# Patient Record
Sex: MALE | Race: WHITE
[De-identification: names, ages, dates, MRNs, and addresses within clinical notes are randomized per-mention and may not be internally consistent; named-entity substitution may affect disease eponyms.]

---

## 2019-06-10 ENCOUNTER — HOSPITAL ENCOUNTER (EMERGENCY)
Dept: HOSPITAL 97 - ER | Age: 39
Discharge: HOME | End: 2019-06-10
Payer: SELF-PAY

## 2019-06-10 VITALS — BODY MASS INDEX: 3124.4 KG/M2

## 2019-06-10 DIAGNOSIS — Z79.82: ICD-10-CM

## 2019-06-10 DIAGNOSIS — Z79.4: ICD-10-CM

## 2019-06-10 DIAGNOSIS — I10: ICD-10-CM

## 2019-06-10 DIAGNOSIS — F17.210: ICD-10-CM

## 2019-06-10 DIAGNOSIS — E08.10: Primary | ICD-10-CM

## 2019-06-10 DIAGNOSIS — K21.9: ICD-10-CM

## 2019-06-10 PROCEDURE — 80076 HEPATIC FUNCTION PANEL: CPT

## 2019-06-10 PROCEDURE — 86308 HETEROPHILE ANTIBODY SCREEN: CPT

## 2019-06-10 PROCEDURE — 81003 URINALYSIS AUTO W/O SCOPE: CPT

## 2019-06-10 PROCEDURE — 96374 THER/PROPH/DIAG INJ IV PUSH: CPT

## 2019-06-10 PROCEDURE — 82962 GLUCOSE BLOOD TEST: CPT

## 2019-06-10 PROCEDURE — 82010 KETONE BODYS QUAN: CPT

## 2019-06-10 PROCEDURE — 84484 ASSAY OF TROPONIN QUANT: CPT

## 2019-06-10 PROCEDURE — 93005 ELECTROCARDIOGRAM TRACING: CPT

## 2019-06-10 PROCEDURE — 71045 X-RAY EXAM CHEST 1 VIEW: CPT

## 2019-06-10 PROCEDURE — 85025 COMPLETE CBC W/AUTO DIFF WBC: CPT

## 2019-06-10 PROCEDURE — 96361 HYDRATE IV INFUSION ADD-ON: CPT

## 2019-06-10 PROCEDURE — 36415 COLL VENOUS BLD VENIPUNCTURE: CPT

## 2019-06-10 PROCEDURE — 80048 BASIC METABOLIC PNL TOTAL CA: CPT

## 2019-06-10 PROCEDURE — 83735 ASSAY OF MAGNESIUM: CPT

## 2019-06-10 PROCEDURE — 99285 EMERGENCY DEPT VISIT HI MDM: CPT

## 2019-06-10 NOTE — XMS REPORT
[facility]

 Created on:2019



Patient:Giuseppe Sorto

Sex:Male

:1980

External Reference #:[id number]





Demographics







 Address  [address] [place] [address]

 

 Phone  [phone]

 

 Preferred Language  en

 

 Marital Status  Unknown

 

 Moravian Affiliation  Unknown

 

 Race  White

 

 Ethnic Group  Unknown









Author







 Organization  [employer]









Care Team Providers







 Name  Role  Phone

 

 Burgos, Na  Provider Role  Unavailable









Allergies, Adverse Reactions, Alerts







 Substance  Reaction  Event Type

 

 N.K.D.A.  Info Not Available  Non Drug Allergy







Problems







 Problem Type  Condition  Code  Onset Dates  Condition Status

 

 Problem  Type 2 diabetes mellitus with  E11.65    Active



   hyperglycemia      

 

 Problem  Fatigue  R53.83    Active

 

 Problem  Long term current use of insulin  Z79.4    Active

 

 Problem  Hyperglycemia  R73.9    Active

 

 Problem  Body mass index (BMI) 23.0-23.9,  Z68.23    Active



   adult      

 

 Problem  Seasonal allergies  J30.2    Active

 

 Problem  Gastrointestinal bleeding  K92.2    Active

 

 Problem  GERD (gastroesophageal reflux  K21.9    Active



   disease)      

 

 Problem  Back pain, lumbosacral  M54.5    Active

 

 Problem  Diabetes  E11.9    Active

 

 Assessment  Hyperglycemia  R73.9    Active

 

 Assessment  Seasonal allergies  J30.2    Active

 

 Assessment  Body mass index (BMI) 23.0-23.9,  Z68.23    Active



   adult      

 

 Assessment  GERD (gastroesophageal reflux  K21.9    Active



   disease)      

 

 Assessment  Long term current use of insulin  Z79.4    Active

 

 Assessment  Type 2 diabetes mellitus with  E11.65    Active



   hyperglycemia      

 

 Assessment  Acute effusion of left ear  H65.192    Active

 

 Assessment  Back pain, lumbosacral  M54.5    Active

 

 Assessment  Contact dermatitis due to  L23.1    Active



   adhesives, unspecified contact      



   dermatitis type      







Medications







 Medication  Code  Code  Instructions  Start  End  Status  Dosage



   System      Date  Date    

 

 Metformin HCl  NDC  60564050373  1000 MG Orally      Active  1 tablet with



       Twice a day        meals

 

 Lisinopril  NDC  [id number]  5 MG Orally      Active  1 tablet



       Once a day        

 

 Lantus  NDC  86536013296  100 UNIT/ML      Active  30 units



 SoloStar      Subcutaneous        



       once daily        

 

 FreeStyle  NDC  23378347839  -  Oct 24,    Active  as directed



 Tommy 14 Day        2018      



 Sensor              

 

 Clotrimazole-  NDC  45737430440  1-0.05 %  Dec 14,    Active  1 application



 Betamethasone      Externally  2018      to affected



       Twice a day        area

 

 Zyrtec  NDC  50119413383  10 MG Orally      Active  1 tablet



 Allergy      Once a day        

 

 Silvadene  NDC  55460433053  1 % Externally      Active  1 application



       Once a day        to affected



               area

 

 Jardiance  NDC  96558597380  25 MG Orally      Active  1 tablet



       Once a day        

 

 Omeprazole  NDC  62014546143  40 MG Orally      Active  1 capsule



       Once a day        

 

 FreeStyle  NDC  21702344986  -  Oct 24,    Active  as directed



 Tommy 14 Day        2018      



 [place]              

 

 Januvia  NDC  46832151753  100 MG Orally      Active  1 tablet



       Once a day        

 

 Aspirin  NDC  91691056930  81 MG Orally      Active  1 tablet



       Once a day        







Results

No Known Results



Summary Purpose

[employer]ClinicalWorks Submission

## 2019-06-10 NOTE — XMS REPORT
[facility]

 Created on:2019



Patient:Giuseppe Sorto

Sex:Male

:1980

External Reference #:[id number]





Demographics







 Address  [address] [place] [address]

 

 Phone  [phone]

 

 Preferred Language  en

 

 Marital Status  Unknown

 

 Zoroastrian Affiliation  Unknown

 

 Race  White

 

 Ethnic Group  Unknown









Author







 Organization  [employer]









Care Team Providers







 Name  Role  Phone

 

 Burgos, Na  Provider Role  Unavailable









Allergies

No Known Allergies



Problems







 Problem Type  Condition  Code  Onset Dates  Condition Status

 

 Problem  Type 2 diabetes mellitus with  E11.65    Active



   hyperglycemia      

 

 Problem  Fatigue  R53.83    Active

 

 Problem  Long term current use of insulin  Z79.4    Active

 

 Problem  Hyperglycemia  R73.9    Active

 

 Problem  Body mass index (BMI) 23.0-23.9,  Z68.23    Active



   adult      

 

 Problem  Seasonal allergies  J30.2    Active

 

 Problem  Gastrointestinal bleeding  K92.2    Active

 

 Problem  GERD (gastroesophageal reflux  K21.9    Active



   disease)      

 

 Problem  Back pain, lumbosacral  M54.5    Active

 

 Problem  Diabetes  E11.9    Active







Medications

No Known Medications



Results

No Known Results



Summary Purpose

[employer] Submission

## 2019-06-10 NOTE — XMS REPORT
[facility]

 Created on:2018



Patient:Giuseppe Sorto

Sex:Male

:1980

External Reference #:[id number]





Demographics







 Address  [address] [place] [address]

 

 Phone  [phone]

 

 Preferred Language  en

 

 Marital Status  Unknown

 

 Denominational Affiliation  Unknown

 

 Race  White

 

 Ethnic Group  Not  or 









Author







 Organization  [employer]









Care Team Providers







 Name  Role  Phone

 

 Burgos, Na  Provider Role  Unavailable









Allergies

No Known Allergies



Problems







 Problem Type  Condition  Code  Onset Dates  Condition Status

 

 Problem  Long term current use of insulin  Z79.4    Active

 

 Problem  Type 2 diabetes mellitus with  E11.65    Active



   hyperglycemia      

 

 Problem  Body mass index (BMI) 23.0-23.9,  Z68.23    Active



   adult      

 

 Problem  Back pain, lumbosacral  M54.5    Active

 

 Problem  Hyperglycemia  R73.9    Active

 

 Problem  GERD (gastroesophageal reflux  K21.9    Active



   disease)      

 

 Problem  Fatigue  R53.83    Active

 

 Problem  Diabetes  E11.9    Active

 

 Problem  Gastrointestinal bleeding  K92.2    Active







Medications

No Known Medications



Results

No Known Results



Summary Purpose

[employer] Submission

## 2019-06-10 NOTE — XMS REPORT
Patient Summary Document

 Created on:Julianne 10, 2019



Patient:HENOK SAMANO

Sex:Male

:1980

External Reference #:[id number]





Demographics







 Address  [address] [place] [address]

 

 Home Phone  [phone]

 

 Email Address  [email or web address]

 

 Preferred Language  Unknown

 

 Marital Status  Unknown

 

 Yazidism Affiliation  Unknown

 

 Race  Unknown

 

 Additional Race(s)  Unavailable

 

 Ethnic Group  Unknown









Author







 Organization  [facility]

 

 Address  [address] [place] [address] [address]. Hernández [address] [place] [address]

 

 Phone  [phone]









Care Team Providers







 Name  Role  Phone

 

 Unavailable  Unavailable  Unavailable









Problems

This patient has no known problems.



Allergies, Adverse Reactions, Alerts

This patient has no known allergies or adverse reactions.



Medications

This patient has no known medications.

## 2019-06-10 NOTE — XMS REPORT
[facility]

 Created on:2018



Patient:Giuseppe Sorto

Sex:Male

:1980

External Reference #:[id number]





Demographics







 Address  [address] [place] [address]

 

 Phone  [phone]

 

 Preferred Language  en

 

 Marital Status  Unknown

 

 Sabianism Affiliation  Unknown

 

 Race  White

 

 Ethnic Group  Not  or 









Author







 Organization  [employer]









Care Team Providers







 Name  Role  Phone

 

 Burgos, Na  Provider Role  Unavailable









Allergies

No Known Allergies



Problems







 Problem Type  Condition  Code  Onset Dates  Condition Status

 

 Problem  Long term current use of insulin  Z79.4    Active

 

 Problem  Type 2 diabetes mellitus with  E11.65    Active



   hyperglycemia      

 

 Assessment  Diabetes  E11.9    Active

 

 Problem  Body mass index (BMI) 23.0-23.9,  Z68.23    Active



   adult      

 

 Problem  Back pain, lumbosacral  M54.5    Active

 

 Problem  Hyperglycemia  R73.9    Active

 

 Problem  GERD (gastroesophageal reflux  K21.9    Active



   disease)      

 

 Problem  Fatigue  R53.83    Active

 

 Problem  Diabetes  E11.9    Active

 

 Problem  Gastrointestinal bleeding  K92.2    Active







Medications

No Known Medications



Results

No Known Results



Summary Purpose

[employer]ClinicalWorks Submission

## 2019-06-10 NOTE — XMS REPORT
[facility]

 Created on:2018



Patient:Giuseppe Sorto

Sex:Male

:1980

External Reference #:[id number]





Demographics







 Address  [address] [place] [address]

 

 Phone  [phone]

 

 Preferred Language  en

 

 Marital Status  Unknown

 

 Temple Affiliation  Unknown

 

 Race  White

 

 Ethnic Group  Not  or 









Author







 Organization  [employer]









Care Team Providers







 Name  Role  Phone

 

 Burgos, Mary  Provider Role  Unavailable









Allergies, Adverse Reactions, Alerts







 Substance  Reaction  Event Type

 

 N.K.D.A.  Info Not Available  Non Drug Allergy







Problems







 Problem Type  Condition  Code  Onset Dates  Condition Status

 

 Assessment  Back pain, lumbosacral  M54.5    Active

 

 Problem  Long term current use of insulin  Z79.4    Active

 

 Problem  Type 2 diabetes mellitus with  E11.65    Active



   hyperglycemia      

 

 Problem  Body mass index (BMI) 23.0-23.9,  Z68.23    Active



   adult      

 

 Problem  Back pain, lumbosacral  M54.5    Active

 

 Problem  Hyperglycemia  R73.9    Active

 

 Problem  GERD (gastroesophageal reflux  K21.9    Active



   disease)      

 

 Problem  Fatigue  R53.83    Active

 

 Problem  Diabetes  E11.9    Active

 

 Problem  Gastrointestinal bleeding  K92.2    Active

 

 Assessment  GERD (gastroesophageal reflux  K21.9    Active



   disease)      

 

 Assessment  Hyperglycemia  R73.9    Active

 

 Assessment  Long term current use of insulin  Z79.4    Active

 

 Assessment  Body mass index (BMI) 23.0-23.9,  Z68.23    Active



   adult      

 

 Assessment  Type 2 diabetes mellitus with  E11.65    Active



   hyperglycemia      







Medications







 Medication  Code  Code  Instructions  Start  End  Status  Dosage



   System      Date  Date    

 

 Januvia  NDC  [id number]  100 MG Orally      Active  1 tablet



       Once a day        

 

 Lisinopril  NDC  43687911416  5 MG Orally      Active  1 tablet



       Once a day        

 

 Aspirin  NDC  40359933543  81 MG Orally      Active  1 tablet



       Once a day        

 

 Jardiance  NDC  80392161650  25 MG Orally      Active  1 tablet



       Once a day        

 

 Omeprazole  NDC  74590139027  40 MG Orally      Active  1 capsule



       Once a day        

 

 Zyrtec  NDC  60775159659  10 MG Orally      Active  1 tablet



 Allergy      Once a day        

 

 Silvadene  NDC  20971964048  1 % Externally      Active  1 application



       Once a day        to affected



               area

 

 Metformin HCl  NDC  04953122092  1000 MG Orally      Active  1 tablet with



       Twice a day        meals

 

 Lantus  NDC  94799060406  100 UNIT/ML      Active  30 units



 SoloStar      Subcutaneous        



       once daily        

 

 Tizanidine  NDC  75216402886  2 MG Orally at  Julianne 15,  Julianne  Active  1 tablet 
as



 HCl      bedtime and may  2018  25,    needed



       increase to    2018    



       every 8 hours        



       as needed        







Results

No Known Results



Summary Purpose

eClinicalWorks Submission

## 2019-06-11 VITALS — TEMPERATURE: 98.7 F | DIASTOLIC BLOOD PRESSURE: 79 MMHG | SYSTOLIC BLOOD PRESSURE: 115 MMHG

## 2019-06-11 VITALS — OXYGEN SATURATION: 97 %

## 2019-06-11 LAB
ALBUMIN SERPL BCP-MCNC: 3.8 G/DL (ref 3.4–5)
ALP SERPL-CCNC: 72 U/L (ref 45–117)
ALT SERPL W P-5'-P-CCNC: 24 U/L (ref 12–78)
AST SERPL W P-5'-P-CCNC: 16 U/L (ref 15–37)
BUN BLD-MCNC: 19 MG/DL (ref 7–18)
BUN BLD-MCNC: 21 MG/DL (ref 7–18)
COHGB MFR BLDA: 0.8 % (ref 0–1.5)
GLUCOSE SERPLBLD-MCNC: 118 MG/DL (ref 74–106)
GLUCOSE SERPLBLD-MCNC: 428 MG/DL (ref 74–106)
HCT VFR BLD CALC: 43 % (ref 39.6–49)
HDLC SERPL-MCNC: 25 MG/DL (ref 40–60)
LDLC SERPL CALC-MCNC: (no result) MG/DL (ref ?–130)
LYMPHOCYTES # SPEC AUTO: 1.6 K/UL (ref 0.7–4.9)
MAGNESIUM SERPL-MCNC: 1.8 MG/DL (ref 1.8–2.4)
OXYHGB MFR BLDA: 94 % (ref 94–97)
PMV BLD: 10.3 FL (ref 7.6–11.3)
POTASSIUM SERPL-SCNC: 3.2 MMOL/L (ref 3.5–5.1)
POTASSIUM SERPL-SCNC: 4.3 MMOL/L (ref 3.5–5.1)
RBC # BLD: 4.27 M/UL (ref 4.33–5.43)
SAO2 % BLDA: 96.4 % (ref 92–98.5)
TROPONIN (EMERG DEPT USE ONLY): < 0.02 NG/ML (ref 0–0.04)

## 2019-06-11 NOTE — P.HP
Certification for Inpatient


Patient admitted to: Inpatient


With expected LOS: >2 Midnights


Practitioner: I am a practitioner with admitting privileges, knowledge of 

patient current condition, hospital course, and medical plan of care.


Services: Services provided to patient in accordance with Admission 

requirements found in Title 42 Section 412.3 of the Code of Federal Regulations





Patient History


Date of Service: 06/11/19


Reason for admission: DKA


History of Present Illness: 





Mr Sorto is a 39 years old male with history of DM II, insulin dependent, 

who start to feel progressively weak about 5 days ago. Yesterday, he start with 

abdominal pain, in epigastric area, associated with nausea, no vomiting. BS was 

elevated, 420. He denied fever or chills. He has never had this symptoms 

before. At my encounter he was alert and oriented. Lab work remarkable for 

hyperglycemia, low bicarbonate with anion gap of 15. No obvious signs of acute 

infection. 


Allergies





No Known Allergies Allergy (Verified 02/25/15 08:29)


 





Home Medications: 








Aspirin [Children's Aspirin] 1 tab PO DAILY 02/25/15 


Esomeprazole Mag Trihydrate [Nexium] 40 mg PO DAILY 02/25/15 


Insulin Glargine Human [Lantus*] 36 unit SQ DAILY 02/25/15 


Lisinopril [Prinivil*] 5 mg PO DAILY 02/25/15 


Metformin HCl [Glucophage] 1,000 mg PO BID 02/25/15 


Sitagliptin Phosphate [Januvia*] 100 mg PO DAILY 02/25/15 








- Past Medical/Surgical History


Has patient received pneumonia vaccine in the past: Yes


Diabetic: Yes


-: DM II


-: esophagitis


-: vasectomy





- Social History


Smoking Status: Light Tobacco smoker (1-9 cigarettes/day)


Counseled patient to stop smoking for: less than 10 minutes


Alcohol use: Yes


CD- Drugs: No


Caffeine use: Yes


Place of Residence: Home





Review of Systems


10-point ROS is otherwise unremarkable





Physical Examination





- Physical Exam


General: Alert, In no apparent distress


HEENT: Atraumatic, PERRLA, Mucous membr. moist/pink, EOMI, Sclerae nonicteric


Neck: Supple, 2+ carotid pulse no bruit, No LAD, Without JVD or thyroid 

abnormality


Respiratory: Clear to auscultation bilaterally, Normal air movement


Cardiovascular: Regular rate/rhythm, Normal S1 S2


Gastrointestinal: Normal bowel sounds, Tenderness (tender to palpation 

epigastric area.)


Musculoskeletal: No tenderness


Integumentary: No rashes


Neurological: Normal gait, Normal speech, Normal strength at 5/5 x4 extr, 

Normal tone, Normal affect


Lymphatics: No axilla or inguinal lymphadenopathy





- Studies


Laboratory Data (last 24 hrs)





06/10/19 23:34: WBC 5.2, Hgb 15.2, Hct 43.0, Plt Count 197


06/10/19 23:34: Sodium 132 L, Potassium 4.3, BUN 21 H, Creatinine 1.38 H, 

Glucose 428 H*, Magnesium 1.8, Total Bilirubin 0.6, AST 16, ALT 24, Alkaline 

Phosphatase 72








Assessment and Plan





- Problems (Diagnosis)


(1) Diabetes mellitus


Current Visit: Yes   Status: Acute   


Qualifiers: 


   Diabetes mellitus type: type 2   Diabetes mellitus long term insulin use: 

with long term use   Diabetes mellitus complication status: with other 

specified complication   Qualified Code(s): E11.69 - Type 2 diabetes mellitus 

with other specified complication; Z79.4 - Long term (current) use of insulin   





(2) DKA (diabetic ketoacidoses)


Current Visit: Yes   Status: Acute   


Qualifiers: 


   Diabetes mellitus type: type 2   Diabetes mellitus complication detail: 

without coma   Qualified Code(s): E11.10 - Type 2 diabetes mellitus with 

ketoacidosis without coma   





- Plan





The patient will be admitted to the hospital due to mild DKA. Will continue 

with insulin drip and fluid replacement by protocol.





- Advance Directives


Does patient have a Living Will: No


Does patient have a Durable POA for Healthcare: No





- Code Status/Comfort Care


Code Status Assessed: Yes


Code Status: Full Code

## 2019-06-11 NOTE — RAD REPORT
EXAM DESCRIPTION:  James Single View6/10/2019 11:44 pm

 

CLINICAL HISTORY:  Shortness of breath

 

COMPARISON:  none

 

FINDINGS:   The lungs appear clear of acute infiltrate. The heart is normal size

 

IMPRESSION:   No acute abnormalities displayed

## 2019-06-11 NOTE — P.SSS
Patient History


Date of Service: 06/11/19


Primary Care Provider: Dr. Burgos; Endocrine-Dr. Burgess


Reason for admission: Nausea and vomiting


History of Present Illness: 





39-year-old  male presented emergency room with increased nausea and 

vomiting.  Patient has diabetes mellitus type 2.  He is insulin dependent.  

Patient reports not taking his basal insulin at times.  Patient found to have 

DKA but mild.





Patient was seen and evaluated the emergency room.  He was treated for DKA.  

This resolved very quickly.  He was transition to his normal regimen.





Patient without any significant nausea and vomiting.  Patient discharged home 

to continue with his medication.  Strict compliance with his basal insulin was 

addressed in detail.  Patient understands this.  Patient plans to follow up 

with Endocrinology


Allergies





No Known Allergies Allergy (Verified 02/25/15 08:29)


 





Home medications list reviewed: Yes


Home Medications: 








Aspirin [Children's Aspirin] 1 tab PO DAILY 02/25/15 


Esomeprazole Mag Trihydrate [Nexium] 40 mg PO DAILY 02/25/15 


Lisinopril [Prinivil*] 5 mg PO DAILY 02/25/15 


Metformin HCl [Glucophage] 1,000 mg PO BID 02/25/15 


Sitagliptin Phosphate [Januvia*] 100 mg PO DAILY 02/25/15 


Insulin Glargine Human [Lantus*] 36 unit SQ DAILY #1 chris 06/11/19 








- Past Medical/Surgical History


Has patient received pneumonia vaccine in the past: Yes


Diabetic: Yes


-: DM II


-: Hypertension


-: GERD


-: vasectomy


Psychosocial/ Personal History: Patient lives at home.





- Family History


Family History: Reviewed- Non-Contributory





- Social History


Smoking Status: Light Tobacco smoker (1-9 cigarettes/day)


Alcohol use: Yes


CD- Drugs: No


Caffeine use: Yes


Place of Residence: Home





Review of Systems


General: As per HPI


Eyes: Unremarkable


ENT: Unremarkable


Respiratory: Unremarkable


Cardiovascular: Unremarkable


Gastrointestinal: Nausea, Vomiting, As per HPI


Genitourinary: Unremarkable


Musculoskeletal: Unremarkable


Integumentary: Unremarkable


Neurological: Unremarkable


Lymphatics: Unremarkable





Physical Examination





- Vital Signs


Temperature: 98.7 F


Blood Pressure: 115/79


Pulse: 85


Respirations: 16


Pulse Ox (%): 99





- Physical Exam


General: Alert, In no apparent distress, Oriented x3, Cooperative


HEENT: Atraumatic, Mucous membr. moist/pink


Neck: Supple


Respiratory: Clear to auscultation bilaterally, Normal air movement


Cardiovascular: Normal pulses, Regular rate/rhythm


Gastrointestinal: Normal bowel sounds, Soft and benign, Non-distended, No 

tenderness, No masses, No rebound, No guarding


Musculoskeletal: No erythema, No tenderness, No warmth


Integumentary: No tenderness/swelling, No erythema, No warmth, No cyanosis


Neurological: Normal speech, Normal strength at 5/5 x4 extr, Normal tone, 

Normal affect





- Studies


Laboratory Data (last 24 hrs)





06/11/19 12:30: Sodium Cancelled, Potassium Cancelled, BUN Cancelled, 

Creatinine Cancelled, Glucose Cancelled


06/11/19 08:30: Sodium Cancelled, Potassium Cancelled, BUN Cancelled, 

Creatinine Cancelled, Glucose Cancelled


06/11/19 04:30: Triglycerides 800 H, Cholesterol 227 H, LDL Cholesterol Direct 

108, HDL Cholesterol 25 L, Cholesterol/HDL Ratio 9.08


06/11/19 04:30: Sodium 140, Potassium 3.2 L, BUN 19 H, Creatinine 0.89, Glucose 

118 H


06/10/19 23:34: WBC 5.2, Hgb 15.2, Hct 43.0, Plt Count 197


06/10/19 23:34: Sodium 132 L, Potassium 4.3, BUN 21 H, Creatinine 1.38 H, 

Glucose 428 H*, Magnesium 1.8, Total Bilirubin 0.6, AST 16, ALT 24, Alkaline 

Phosphatase 72





Treatment Summary: 





Impression:


DKA with diabetes mellitus type 2, insulin-dependent


Hypertension


GERD





Plan:


Patient presented with DKA.  Patient with history of diabetes type 2 insulin 

dependent.  DKA has resolved.  Patient without any nausea, vomiting at this 

time.  Lab significantly improved.  Hemoglobin A1c 11.0.  Patient has not been 

compliant with his insulin regimen-Lantus.  Patient understands now that he 

needs to take his insulin-Lantus 36 units SC daily.  He had not been taking 

this on a regular basis.  At discharge patient will continue with Lantus 36 

units subcu daily.  Recommendation is to maintain blood sugars less 140 fasting 

and less than 200 after meals.  If blood sugar remains above 200 he can 

increase Lantus by 1-2 units daily for better blood sugar control.  Further 

adjustment in medication may be required.  His PCP or endocrinologist can 

further adjust his medication.  Patient also takes metformin 1000 mg 1 pill 

twice daily and Januvia 100 mg daily.  Patient will continue with his diabetic 

regimen.  Recommendation is for the patient to follow up with Endocrinology in 1

-2 weeks to follow up this hospitalization and continue his care.  Education on 

DKA and diabetes mellitus will be provided.





Patient with hypertension.  Patient will resume lisinopril 5 mg daily.  

Recommendation is to maintain blood pressure less than 150/80.  Further 

adjustment can be done by his PCP for better control.





Patient with history of GERD.  At discharge he will continue with Nexium 40 mg 

daily.





- Disposition


Disposition: ROUTINE DISCHARGE


Condition: GOOD


Prescriptions: 


Insulin Glargine Human [Lantus*] 36 unit SQ DAILY #1 chris


Followup: 


Mary Burgos DO [Primary Care Provider] - Prvqi ORTIZ As Needed


Patient Discharge Instructions: 1.  Patient will follow up with his PCP on 

Friday to follow up this hospitalization.  2.  Patient presented with DKA.  

Patient with history of diabetes type 2 insulin dependent.  DKA has resolved.  

Patient without any nausea, vomiting.  Lab stable at this time.  Hemoglobin A1c 

11.0.  Patient has not been compliant with his insulin regimen.  Patient 

understands that the patient needs to take his insulin-Lantus 36 units SC 

daily.  At discharge patient will continue with Lantus 36 units subcu daily.  

Recommendation is to maintain blood sugars less 140 fasting and less than 200 

after meals.  If blood sugar remains above 200 he can increase Lantus by 1-2 

units daily for better blood sugar control.  Further adjustment in medication 

may be required.  His PCP or endocrinologist can further adjust his medication.

  Patient also takes metformin 1000 mg 1 pill twice daily and Januvia 100 mg 

daily.  Patient will continue with his diabetic regimen.  Recommendation is for 

the patient to follow up with Endocrinology in 1-2 weeks to follow up this 

hospitalization and continue his care.  Education on DKA and diabetes mellitus 

will be provided.  3.  Patient with hypertension.  Patient will resume 

lisinopril 5 mg daily.  Recommendation is to maintain blood pressure less than 

150/80.  Further adjustment can be done by his PCP for better control.  4.  

Patient with history of GERD.  At discharge he will continue with Nexium 40 mg 

daily.


Diet: ADA (2000 ADA)


Activity: Ad ismael


Time Spent Managing Pts Care (In Minutes): 55

## 2019-06-11 NOTE — EDPHYS
Physician Documentation                                                                           

 [facility]                                                                 

Name: Giuseppe Sorto                                                                          

Age: 39 yrs                                                                                       

Sex: Male                                                                                         

: 1980                                                                                   

MRN: [id number]                                                                                   

Arrival Date: 06/10/2019                                                                          

Time: 22:40                                                                                       

Account#: [id number]                                                                            

Bed 17                                                                                            

Private MD:                                                                                       

ED Physician Fred Willard                                                                    

HPI:                                                                                              

                                                                                             

00:36 This 39 yrs old  Male presents to ER via Ambulatory with complaints of High    kb  

      Blood Sugar, Abdominal Pain.                                                                

00:36 The patient or guardian reports hyperglycemia, that was potentially precipitated by no  kb  

      particular event, with the patient's symptoms witnessed by no one. Onset: The               

      symptoms/episode began/occurred today. Associated signs and symptoms: Pertinent             

      positives: abdominal pain, weakness, fatigue. Current symptoms: In the emergency            

      department the patient's symptoms are unchanged from the initial presentation. The          

      patient has not experienced similar symptoms in the past. The patient has not recently      

      seen a physician.                                                                           

                                                                                                  

Historical:                                                                                       

- Allergies:                                                                                      

06/10                                                                                             

22:45 No Known Allergies;                                                                     ed1 

- Home Meds:                                                                                      

22:45 omeprazole 40 mg Oral cpDR 1 cap once daily [Active]; Jardiance 10 mg oral tab 1 tab    ed1 

      once daily [Active]; Januvia 100 mg oral tab 1 tab once daily [Active]; metformin 1,000     

      mg Oral TG24 1 tab 2 times per day [Active]; lisinopril 5 mg Oral tab 1 tab once daily      

      [Active]; Lantus 100 unit/mL Sub-Q soln 30 unit daily [Active]; aspirin 81 mg Oral chew     

      1 tab once daily [Active]; nateglinide 120 mg oral tab 1 tab 3 times per day [Active];      

- PMHx:                                                                                           

22:45 Diabetes - NIDDM; Esophigitis;                                                          ed1 

- PSHx:                                                                                           

22:45 None;                                                                                   ed1 

                                                                                                  

- Immunization history:: Adult Immunizations up to date.                                          

- Social history:: Smoking status: Patient uses tobacco products, denies chronic                  

  smoking, but will smoke occasionally.                                                           

- Ebola Screening: : Patient negative for fever greater than or equal to 101.5 degrees            

  Fahrenheit, and additional compatible Ebola Virus Disease symptoms Patient denies               

  exposure to infectious person Patient denies travel to an Ebola-affected area in the            

  21 days before illness onset No symptoms or risks identified at this time.                      

                                                                                                  

                                                                                                  

ROS:                                                                                              

                                                                                             

00:35 Cardiovascular: Negative for chest pain, palpitations, and edema, Respiratory: Negative kb  

      for shortness of breath, cough, wheezing, and pleuritic chest pain, Back: Negative for      

      injury and pain, : Negative for injury, bleeding, discharge, and swelling,                

      MS/Extremity: Negative for injury and deformity, Skin: Negative for injury, rash, and       

      discoloration.                                                                              

      Constitutional: Positive for malaise.                                                       

      Abdomen/GI: Positive for abdominal pain, Negative for nausea, vomiting, and diarrhea.       

      Neuro: Positive for weakness.                                                               

                                                                                                  

Exam:                                                                                             

00:35 Constitutional:  This is a well developed, well nourished patient who is awake, alert,  kb  

      and in no acute distress. Head/Face:  Normocephalic, atraumatic. Chest/axilla:  Normal      

      chest wall appearance and motion.  Nontender with no deformity.  No lesions are             

      appreciated. Cardiovascular:  Regular rate and rhythm with a normal S1 and S2.  No          

      gallops, murmurs, or rubs.  Normal PMI, no JVD.  No pulse deficits. Respiratory:  Lungs     

      have equal breath sounds bilaterally, clear to auscultation and percussion.  No rales,      

      rhonchi or wheezes noted.  No increased work of breathing, no retractions or nasal          

      flaring. Back:  No spinal tenderness.  No costovertebral tenderness.  Full range of         

      motion. Skin:  Warm, dry with normal turgor.  Normal color with no rashes, no lesions,      

      and no evidence of cellulitis. MS/ Extremity:  Pulses equal, no cyanosis.                   

      Neurovascular intact.  Full, normal range of motion. Neuro:  Awake and alert, GCS 15,       

      oriented to person, place, time, and situation.  Cranial nerves II-XII grossly intact.      

      Motor strength 5/5 in all extremities.  Sensory grossly intact.  Cerebellar exam            

      normal.  Normal gait.                                                                       

00:35 Abdomen/GI: Inspection: abdomen appears normal, Bowel sounds: normal, in all quadrants,     

      Palpation: soft, in all quadrants, mild abdominal tenderness, in all quadrants.             

                                                                                                  

Vital Signs:                                                                                      

06/10                                                                                             

22:45  / 84; Pulse 114; Resp 19; Temp 97.8; Pulse Ox 95% on R/A; Weight 72.57 kg;       ed1 

      Height 6 ft. 0 in. (182.88 cm); Pain 3/10;                                                  

23:45  / 82; Pulse 78; Resp 17; Pulse Ox 97% on R/A;                                    ca1 

                                                                                             

00:22  / 89; Pulse 80; Resp 17 S; Temp 98(O); Pulse Ox 97% on R/A;                      ca1 

01:02  / 86; Pulse 73; Resp 18; Pulse Ox 98% on R/A; Pain 0/10;                         mg2 

02:10  / 84; Pulse 73; Resp 18; Temp 98; Pulse Ox 97% on R/A; Pain 0/10;                mg2 

06/10                                                                                             

22:45 Body Mass Index 21.70 (72.57 kg, 182.88 cm)                                             ed1 

                                                                                                  

MDM:                                                                                              

06/10                                                                                             

23:06 Patient medically screened.                                                             kb  

                                                                                             

00:33 Data reviewed: vital signs, nurses notes. Data interpreted: Pulse oximetry: on room air kb  

      is 97 %. Interpretation: normal. Counseling: I had a detailed discussion with the           

      patient and/or guardian regarding: the historical points, exam findings, and any            

      diagnostic results supporting the discharge/admit diagnosis, lab results, radiology         

      results, the need for further work-up and treatment in the hospital. Physician              

      consultation: Madelyn Negron MD was contacted at 00:34, regarding admission, to the         

      ICU, patient's condition, and will see patient in ED, shortly.                              

                                                                                                  

06/10                                                                                             

23:12 Order name: Acetone, Serum                                                              kb  

06/10                                                                                             

23:12 Order name: Basic Metabolic Panel                                                       kb  

06/10                                                                                             

23:12 Order name: CBC with Diff; Complete Time: 00:23                                         kb  

06/10                                                                                             

23:12 Order name: LFT's; Complete Time: 00:29                                                 kb  

06/10                                                                                             

23:12 Order name: Magnesium; Complete Time: 00:29                                             kb  

06/10                                                                                             

23:12 Order name: Troponin (emerg Dept Use Only); Complete Time: 00:29                        kb  

06/10                                                                                             

23:12 Order name: Mono Screen Profile; Complete Time: 00:09                                   kb  

06/10                                                                                             

23:14 Order name: Acetone Level; Complete Time: 00:29                                         EDMS

06/10                                                                                             

23:14 Order name: Basic Metabolic Panel; Complete Time: 00:29                                 EDMS

06/10                                                                                             

23:42 Order name: Urine Dipstick--Ancillary (enter results); Complete Time: 00:23             mw2 

                                                                                             

00:32 Order name: ABG; Complete Time: 01:10                                                   kb  

                                                                                             

05:08 Order name: Lipid Profile                                                               EDMS

                                                                                             

05:08 Order name: Basic Metabolic Panel                                                       EDMS

                                                                                             

05:12 Order name: Hemoglobin A1c                                                              EDMS

06/10                                                                                             

23:12 Order name: IV Start; Complete Time: 23:34                                              kb  

06/10                                                                                             

23:12 Order name: XRAY Chest (1 view)                                                         kb  

06/10                                                                                             

23:12 Order name: EKG; Complete Time: 23:15                                                   kb  

06/10                                                                                             

23:12 Order name: Cardiac monitoring; Complete Time: 23:35                                    kb  

06/10                                                                                             

23:12 Order name: EKG - Nurse/Tech; Complete Time: 23:34                                      kb  

06/10                                                                                             

23:12 Order name: Labs collected and sent; Complete Time: 23:34                               kb  

06/10                                                                                             

23:12 Order name: O2 Per Protocol; Complete Time: 23:34                                       kb  

06/10                                                                                             

23:12 Order name: O2 Sat Monitoring; Complete Time: 23:34                                     kb  

                                                                                             

05:22 Order name: LDL, Direct                                                                 EDMS

                                                                                                  

Administered Medications:                                                                         

06/10                                                                                             

23:35 Drug: NS 0.9% 1000 ml Route: IV; Rate: 1000 ml; Site: right antecubital;                ca1 

                                                                                             

00:36 Follow up: Response: No adverse reaction; IV Status: Completed infusion                 ca1 

00:52 Drug: NS 0.9% 1000 ml Route: IV; Rate: 1000 ml; Site: right antecubital;                mg2 

03:21 Follow up: Response: No adverse reaction; IV Status: Completed infusion; IV Intake:     mg2 

      1000ml                                                                                      

00:53 Drug: Insulin Drip - (Insulin Regular Human 100 units, NS 0.9% 100 ml) {Co-Signature:   mg2 

      ca1 (Teri Aly RN).} Route: IV; Rate: calculated rate; Site: right antecubital;           

                                                                                                  

                                                                                                  

Point of Care Testing:                                                                            

      Blood Glucose:                                                                              

06/10                                                                                             

22:45 Blood Glucose: 438 mg/dL;                                                               ed1 

                                                                                             

02:10 Blood Glucose: 161 mg/dL;                                                               mg2 

      Ranges:                                                                                     

      Critical Glucose Levels:Adult <50 mg/dl or >400 mg/dl  <40 mg/dl or >180 mg/dl       

Disposition:                                                                                      

19 00:34 Hospitalization ordered by Madelyn Negron for Inpatient Admission. Preliminary    

  diagnosis is Diabetes mellitus due to underlying condition with ketoacidosis                    

  without coma.                                                                                   

- Bed requested for [facility] ER HOLD.                                                                 

- Status is Inpatient Admission.                                                              bp  

- Condition is Stable.                                                                            

- Problem is new.                                                                                 

- Symptoms are unchanged.                                                                         

UTI on Admission? No                                                                              

                                                                                                  

                                                                                                  

                                                                                                  

Signatures:                                                                                       

Dispatcher [employer]                           EDMS                                                 

Petrona Andrews, FNP-C                 FNP-Ckb                                                   

Evelyn Preciado RN                        RN   mw                                                   

Delma Cadet, RN                        RN   ed1                                                  

Dustin Caldwell, RN                      RN   bp                                                   

Arnie Bob, RN                    RN   mg2                                                  

Teri Aly, RN                        RN   ca1                                                  

Teri Aly RN                               ca1                                                  

                                                                                                  

Corrections: (The following items were deleted from the chart)                                    

01:06 00:34 Hospitalization Ordered by Madelyn Negron MD for Inpatient Admission. Preliminary mw  

      diagnosis is Diabetes mellitus due to underlying condition with ketoacidosis without        

      coma. Bed requested for Intensive Care Unit. Status is Inpatient Admission. Condition       

      is Stable. Problem is new. Symptoms are unchanged. UTI on Admission? No. kb                 

08:05 01:06 2019 00:34 Hospitalization Ordered by Madelyn Negron MD for Inpatient       bp  

      Admission. Preliminary diagnosis is Diabetes mellitus due to underlying condition with      

      ketoacidosis without coma. Bed requested for [facility] ER HOLD. Status is Inpatient              

      Admission. Condition is Stable. Problem is new. Symptoms are unchanged. UTI on              

      Admission? No. mw                                                                           

                                                                                                  

**************************************************************************************************

## 2019-06-11 NOTE — EKG
Test Date:    2019-06-10               Test Time:    23:25:12

Technician:   CA                                     

                                                     

MEASUREMENT RESULTS:                                       

Intervals:                                           

Rate:         79                                     

TN:           134                                    

QRSD:         82                                     

QT:           350                                    

QTc:          401                                    

Axis:                                                

P:            54                                     

TN:           134                                    

QRS:          69                                     

T:            46                                     

                                                     

INTERPRETIVE STATEMENTS:                                       

                                                     

Normal sinus rhythm

Normal ECG

No previous ECG available for comparison



Electronically Signed On 06-11-19 12:02:30 CDT by Leif Sutton

## 2019-06-11 NOTE — ER
Nurse's Notes                                                                                     

 [facility]                                                                 

Name: Giuseppe Sorto                                                                          

Age: 39 yrs                                                                                       

Sex: Male                                                                                         

: 1980                                                                                   

MRN: [id number]                                                                                   

Arrival Date: 06/10/2019                                                                          

Time: 22:40                                                                                       

Account#: [id number]                                                                            

Bed 17                                                                                            

Private MD:                                                                                       

Diagnosis: Diabetes mellitus due to underlying condition with ketoacidosis without coma           

                                                                                                  

Presentation:                                                                                     

06/10                                                                                             

22:40 Presenting complaint: Patient states: I have been very tired for over a week now. I     ed1 

      checked my blood sugar at home and it was 430. I have been having abdominal pain. I         

      feel so weak. Transition of care: patient was not received from another setting of          

      care. Onset of symptoms was Julianne 3, 2019. Risk Assessment: Do you want to hurt yourself     

      or someone else? Patient reports no desire to harm self or others. Initial Sepsis           

      Screen: Does the patient meet any 2 criteria? No. Patient's initial sepsis screen is        

      negative. Does the patient have a suspected source of infection? No. Patient's initial      

      sepsis screen is negative. Care prior to arrival: None.                                     

22:40 Method Of Arrival: Ambulatory                                                           ed1 

22:40 Acuity: MATY 3                                                                           ed1 

                                                                                                  

Triage Assessment:                                                                                

22:45 General: Appears in no apparent distress. Behavior is calm, cooperative. Pain:          ed1 

      Complains of pain in abdomen Pain currently is 3 out of 10 on a pain scale. at worst        

      was 9 out of 10 on a pain scale. GI: Reports upper abdominal pain, Patient currently        

      denies diarrhea, nausea, vomiting.                                                          

                                                                                                  

Historical:                                                                                       

- Allergies:                                                                                      

22:45 No Known Allergies;                                                                     ed1 

- Home Meds:                                                                                      

22:45 omeprazole 40 mg Oral cpDR 1 cap once daily [Active]; Jardiance 10 mg oral tab 1 tab    ed1 

      once daily [Active]; Januvia 100 mg oral tab 1 tab once daily [Active]; metformin 1,000     

      mg Oral TG24 1 tab 2 times per day [Active]; lisinopril 5 mg Oral tab 1 tab once daily      

      [Active]; Lantus 100 unit/mL Sub-Q soln 30 unit daily [Active]; aspirin 81 mg Oral chew     

      1 tab once daily [Active]; nateglinide 120 mg oral tab 1 tab 3 times per day [Active];      

- PMHx:                                                                                           

22:45 Diabetes - NIDDM; Esophigitis;                                                          ed1 

- PSHx:                                                                                           

22:45 None;                                                                                   ed1 

                                                                                                  

- Immunization history:: Adult Immunizations up to date.                                          

- Social history:: Smoking status: Patient uses tobacco products, denies chronic                  

  smoking, but will smoke occasionally.                                                           

- Ebola Screening: : Patient negative for fever greater than or equal to 101.5 degrees            

  Fahrenheit, and additional compatible Ebola Virus Disease symptoms Patient denies               

  exposure to infectious person Patient denies travel to an Ebola-affected area in the            

  21 days before illness onset No symptoms or risks identified at this time.                      

                                                                                                  

                                                                                                  

Screenin:35 Abuse screen: Denies threats or abuse. Denies injuries from another. Nutritional        ca1 

      screening: No deficits noted. Tuberculosis screening: No symptoms or risk factors           

      identified. Fall Risk None identified.                                                      

                                                                                                  

Assessment:                                                                                       

23:35 General: Appears in no apparent distress. comfortable, Behavior is calm, cooperative,   ca1 

      appropriate for age. General: Reports fatigue for 12-24 hours. Pain: Complains of pain      

      in abdomen Pain does not radiate. Pain currently is 7 out of 10 on a pain scale.            

      Quality of pain is described as squeezing, Pain began 1 day ago. Is intermittent.           

      Neuro: Level of Consciousness is awake, alert, obeys commands, Oriented to person,          

      place, time, situation. Cardiovascular: Heart tones S1 S2 present Capillary refill < 3      

      seconds Patient's skin is warm and dry. Cardiovascular: Rhythm is sinus rhythm.             

      Respiratory: Airway is patent Respiratory effort is even, unlabored, Respiratory            

      pattern is regular, symmetrical, Breath sounds are clear bilaterally. GI: Abdomen is        

      flat, non-distended, Bowel sounds present X 4 quads. Abd is soft and non tender X 4         

      quads. Patient currently denies diarrhea, nausea, vomiting. : No deficits noted. No       

      signs and/or symptoms were reported regarding the genitourinary system. EENT: No            

      deficits noted. No signs and/or symptoms were reported regarding the EENT system. Derm:     

      Skin is intact, is healthy with good turgor, Skin is pink, warm \T\ dry. Musculoskeletal:   

      Circulation, motion, and sensation intact. Capillary refill < 3 seconds, Range of           

      motion: intact in all extremities.                                                          

                                                                                             

00:22 Reassessment: Patient appears in no apparent distress at this time. Patient and/or      ca1 

      family updated on plan of care and expected duration. Pain level reassessed. Patient is     

      alert, oriented x 3, equal unlabored respirations, skin warm/dry/pink.                      

01:02 Reassessment: patient informed about the need for hospitalization to ICU. patient       mg2 

      agreed.                                                                                     

02:11 Reassessment: documentation will continue in [facility].                                  mg2 

07:00 Reassessment: PT IS ER HOLD. SEE [facility] FOR FURTHER DATA.                             bp  

07:55 Reassessment: PT D/C BY JUNG.                                                         bp  

                                                                                                  

Vital Signs:                                                                                      

06/10                                                                                             

22:45  / 84; Pulse 114; Resp 19; Temp 97.8; Pulse Ox 95% on R/A; Weight 72.57 kg;       ed1 

      Height 6 ft. 0 in. (182.88 cm); Pain 3/10;                                                  

23:45  / 82; Pulse 78; Resp 17; Pulse Ox 97% on R/A;                                    ca1 

                                                                                             

00:22  / 89; Pulse 80; Resp 17 S; Temp 98(O); Pulse Ox 97% on R/A;                      ca1 

01:02  / 86; Pulse 73; Resp 18; Pulse Ox 98% on R/A; Pain 0/10;                         mg2 

02:10  / 84; Pulse 73; Resp 18; Temp 98; Pulse Ox 97% on R/A; Pain 0/10;                mg2 

0610                                                                                             

22:45 Body Mass Index 21.70 (72.57 kg, 182.88 cm)                                             ed1 

                                                                                                  

ED Course:                                                                                        

06/10                                                                                             

22:40 Patient arrived in ED.                                                                  ed1 

22:41 Triage completed.                                                                       ed1 

22:45 Arm band placed on left wrist. Patient placed in waiting room, Patient notified of wait ed1 

      time.                                                                                       

23:06 Petrona Andrews FNP-C is PHCP.                                                        kb  

23:06 Fred Willard MD is Attending Physician.                                           kb  

23:08 Teri Aly, RN is Primary Nurse.                                                      ca1 

23:32 Inserted saline lock: 18 gauge in right antecubital area, using aseptic technique.      ca1 

      ,using aseptic technique. by Alyx Valentin,  Tech Blood collected.                            

23:35 Patient has correct armband on for positive identification. Placed in gown. Bed in low  ca1 

      position. Call light in reach. Side rails up X 1. Cardiac monitor on. Pulse ox on. NIBP     

      on. Warm blanket given.                                                                     

23:41 No provider procedures requiring assistance completed.                                  ca1 

23:43 XRAY Chest (1 view) In Process Unspecified.                                             EDMS

                                                                                             

00:02 X-ray completed. Portable x-ray completed in exam room. Patient tolerated procedure     kw  

      well.                                                                                       

00:34 Madelyn Negron MD is Hospitalizing Provider.                                          kb  

00:52 Inserted saline lock: 20 gauge in left antecubital area, using aseptic technique.       ca1 

08:05 IV discontinued, intact, bleeding controlled, No redness/swelling at site. Pressure     bp  

      dressing applied.                                                                           

                                                                                                  

Administered Medications:                                                                         

06/10                                                                                             

23:35 Drug: NS 0.9% 1000 ml Route: IV; Rate: 1000 ml; Site: right antecubital;                ca1 

06                                                                                             

00:36 Follow up: Response: No adverse reaction; IV Status: Completed infusion                 ca1 

00:52 Drug: NS 0.9% 1000 ml Route: IV; Rate: 1000 ml; Site: right antecubital;                mg2 

03:21 Follow up: Response: No adverse reaction; IV Status: Completed infusion; IV Intake:     mg2 

      1000ml                                                                                      

00:53 Drug: Insulin Drip - (Insulin Regular Human 100 units, NS 0.9% 100 ml) {Co-Signature:   mg2 

      ca1 (Teri Aly RN).} Route: IV; Rate: calculated rate; Site: right antecubital;           

                                                                                                  

                                                                                                  

Point of Care Testing:                                                                            

      Blood Glucose:                                                                              

06/10                                                                                             

22:45 Blood Glucose: 438 mg/dL;                                                               ed1 

                                                                                             

02:10 Blood Glucose: 161 mg/dL;                                                               mg2 

      Ranges:                                                                                     

                                                                                                  

Intake:                                                                                           

03:21 IV: 1000ml; Total: 1000ml.                                                              mg2 

                                                                                                  

Outcome:                                                                                          

00:34 Decision to Hospitalize by Provider.                                                    kb  

08:05 Discharged to home ambulatory.                                                          bp  

08:05 Condition: stable                                                                           

08:05 Discharge instructions given to patient, Instructed on discharge instructions, follow       

      up and referral plans. medication usage, Demonstrated understanding of instructions,        

      follow-up care, medications.                                                                

08:05 Patient left the ED.                                                                    bp  

                                                                                                  

Signatures:                                                                                       

Dispatcher [employer]                           EDMS                                                 

Petrona Andrews, CHANAPANGELA AUGUST-Delma Kelley RN                        RN   ed1                                                  

Jojo Chappell Brian, RN                      RN   Arnie Long RN                    RN   mg2                                                  

Teri Aly RN                        RN   ca1                                                  

Teri Aly RN                               ca1                                                  

                                                                                                  

**************************************************************************************************

## 2020-02-17 ENCOUNTER — HOSPITAL ENCOUNTER (INPATIENT)
Dept: HOSPITAL 97 - ER | Age: 40
LOS: 2 days | Discharge: HOME | DRG: 379 | End: 2020-02-19
Attending: FAMILY MEDICINE | Admitting: HOSPITALIST
Payer: COMMERCIAL

## 2020-02-17 VITALS — BODY MASS INDEX: 24.9 KG/M2

## 2020-02-17 DIAGNOSIS — E11.69: ICD-10-CM

## 2020-02-17 DIAGNOSIS — K22.8: ICD-10-CM

## 2020-02-17 DIAGNOSIS — K20.9: ICD-10-CM

## 2020-02-17 DIAGNOSIS — Z79.4: ICD-10-CM

## 2020-02-17 DIAGNOSIS — K92.0: Primary | ICD-10-CM

## 2020-02-17 DIAGNOSIS — F10.20: ICD-10-CM

## 2020-02-17 LAB
ALBUMIN SERPL BCP-MCNC: 4.1 G/DL (ref 3.4–5)
ALP SERPL-CCNC: 54 U/L (ref 45–117)
ALT SERPL W P-5'-P-CCNC: 21 U/L (ref 12–78)
AST SERPL W P-5'-P-CCNC: 17 U/L (ref 15–37)
BUN BLD-MCNC: 12 MG/DL (ref 7–18)
GLUCOSE SERPLBLD-MCNC: 317 MG/DL (ref 74–106)
HCT VFR BLD CALC: 45.3 % (ref 39.6–49)
INR BLD: 0.87
LIPASE SERPL-CCNC: 211 U/L (ref 73–393)
LYMPHOCYTES # SPEC AUTO: 2.1 K/UL (ref 0.7–4.9)
PMV BLD: 9.6 FL (ref 7.6–11.3)
POTASSIUM SERPL-SCNC: 3.6 MMOL/L (ref 3.5–5.1)
RBC # BLD: 4.6 M/UL (ref 4.33–5.43)

## 2020-02-17 PROCEDURE — 80320 DRUG SCREEN QUANTALCOHOLS: CPT

## 2020-02-17 PROCEDURE — 88312 SPECIAL STAINS GROUP 1: CPT

## 2020-02-17 PROCEDURE — 74177 CT ABD & PELVIS W/CONTRAST: CPT

## 2020-02-17 PROCEDURE — 80048 BASIC METABOLIC PNL TOTAL CA: CPT

## 2020-02-17 PROCEDURE — 96366 THER/PROPH/DIAG IV INF ADDON: CPT

## 2020-02-17 PROCEDURE — 85610 PROTHROMBIN TIME: CPT

## 2020-02-17 PROCEDURE — 96375 TX/PRO/DX INJ NEW DRUG ADDON: CPT

## 2020-02-17 PROCEDURE — 83690 ASSAY OF LIPASE: CPT

## 2020-02-17 PROCEDURE — 86900 BLOOD TYPING SEROLOGIC ABO: CPT

## 2020-02-17 PROCEDURE — 99285 EMERGENCY DEPT VISIT HI MDM: CPT

## 2020-02-17 PROCEDURE — 96361 HYDRATE IV INFUSION ADD-ON: CPT

## 2020-02-17 PROCEDURE — 96368 THER/DIAG CONCURRENT INF: CPT

## 2020-02-17 PROCEDURE — 88305 TISSUE EXAM BY PATHOLOGIST: CPT

## 2020-02-17 PROCEDURE — 80076 HEPATIC FUNCTION PANEL: CPT

## 2020-02-17 PROCEDURE — 83986 ASSAY PH BODY FLUID NOS: CPT

## 2020-02-17 PROCEDURE — 85025 COMPLETE CBC W/AUTO DIFF WBC: CPT

## 2020-02-17 PROCEDURE — 96367 TX/PROPH/DG ADDL SEQ IV INF: CPT

## 2020-02-17 PROCEDURE — 90471 IMMUNIZATION ADMIN: CPT

## 2020-02-17 PROCEDURE — 36415 COLL VENOUS BLD VENIPUNCTURE: CPT

## 2020-02-17 PROCEDURE — 86850 RBC ANTIBODY SCREEN: CPT

## 2020-02-17 PROCEDURE — 86901 BLOOD TYPING SEROLOGIC RH(D): CPT

## 2020-02-17 PROCEDURE — 81001 URINALYSIS AUTO W/SCOPE: CPT

## 2020-02-17 PROCEDURE — 85014 HEMATOCRIT: CPT

## 2020-02-17 PROCEDURE — 96365 THER/PROPH/DIAG IV INF INIT: CPT

## 2020-02-17 PROCEDURE — 85018 HEMOGLOBIN: CPT

## 2020-02-17 PROCEDURE — 82271 OCCULT BLOOD OTHER SOURCES: CPT

## 2020-02-17 PROCEDURE — 82947 ASSAY GLUCOSE BLOOD QUANT: CPT

## 2020-02-17 NOTE — XMS REPORT
[facility]

 Created on:July 15, 2019



Patient:Giuseppe Sorto

Sex:Male

:1980

External Reference #:[id number]





Demographics







 Address  [address] [place] [id number]

 

 Phone  Unavailable

 

 Preferred Language  en

 

 Marital Status  Unknown

 

 Rastaf[employer] Affiliation  Unknown

 

 Race  White

 

 Ethnic Group  Unknown









Author







 Organization  [employer]









Care Team Providers







 Name  Role  Phone

 

 Brayan Kenan  Provider Role  Unavailable









Allergies

No Known Allergies



Problems







 Problem Type  Condition  Code  Onset Dates  Condition Status

 

 Problem  Type 2 diabetes mellitus with  E11.65    Active



   hyperglycemia      

 

 Problem  Fatigue  R53.83    Active

 

 Problem  Long term current use of insulin  Z79.4    Active

 

 Problem  Hyperglycemia  R73.9    Active

 

 Problem  Body mass index (BMI) 23.0-23.9,  Z68.23    Active



   adult      

 

 Problem  Seasonal allergies  J30.2    Active

 

 Problem  Gastrointestinal bleeding  K92.2    Active

 

 Problem  GERD (gastroesophageal reflux  K21.9    Active



   disease)      

 

 Problem  Back pain, lumbosacral  M54.5    Active

 

 Problem  Diabetes  E11.9    Active







Medications







 Medication  Code System  Code  Instructions  Start  End Date  Status  Dosage



         Date      

 

 Lisinopril  NDC  83127928073  5 MG Orally Once      Active  1 tablet



       a day        

 

 Omeprazole  NDC  38661140576  40 MG Orally Once      Active  1 capsule



       a day        







Results

No Known Results



Summary Purpose

[employer] Submission

## 2020-02-17 NOTE — XMS REPORT
[facility]

 Created on:2019



Patient:Giuseppe Sorto

Sex:Male

:1980

External Reference #:[id number]





Demographics







 Address  [address] [place] [id number]

 

 Phone  Unavailable

 

 Preferred Language  en

 

 Marital Status  Unknown

 

 Restorationism Affiliation  Unknown

 

 Race  White

 

 Ethnic Group  Unknown









Author







 Organization  [employer]









Care Team Providers







 Name  Role  Phone

 

 Brayan Kenan  Provider Role  Unavailable









Allergies

No Known Allergies



Problems







 Problem Type  Condition  Code  Onset Dates  Condition Status

 

 Problem  Type 2 diabetes mellitus with  E11.65    Active



   hyperglycemia      

 

 Problem  Fatigue  R53.83    Active

 

 Problem  Long term current use of insulin  Z79.4    Active

 

 Problem  Hyperglycemia  R73.9    Active

 

 Problem  Body mass index (BMI) 23.0-23.9,  Z68.23    Active



   adult      

 

 Problem  Seasonal allergies  J30.2    Active

 

 Problem  Gastrointestinal bleeding  K92.2    Active

 

 Problem  GERD (gastroesophageal reflux  K21.9    Active



   disease)      

 

 Problem  Back pain, lumbosacral  M54.5    Active

 

 Problem  Diabetes  E11.9    Active







Medications







 Medication  Code  Code  Instructions  Start  End  Status  Dosage



   System      Date  Date    

 

 BD Ultra-Fine  NDC  [id number]  4mm x 32Gm  ,    Active  1 needle



 Hilda Pen      subcutaneously  2019      with



 Needles      once a day        Tresiba



               pen







Results

No Known Results



Summary Purpose

[employer] Submission

## 2020-02-17 NOTE — XMS REPORT
[facility]

 Created on:2019



Patient:Giuseppe Sorto

Sex:Male

:1980

External Reference #:[id number]





Demographics







 Address  [address] [place] [id number]

 

 Phone  Unavailable

 

 Preferred Language  en

 

 Marital Status  Unknown

 

 [employer] Affiliation  Unknown

 

 Race  White

 

 Ethnic Group  Unknown









Author







 Organization  [employer]









Care Team Providers







 Name  Role  Phone

 

 Kenan Schmidt  Provider Role  Unavailable









Allergies, Adverse Reactions, Alerts







 Substance  Reaction  Event Type

 

 N.K.D.A.  Info Not Available  Non Drug Allergy







Problems







 Problem Type  Condition  Code  Onset Dates  Condition Status

 

 Problem  Type 2 diabetes mellitus with  E11.65    Active



   hyperglycemia      

 

 Problem  Fatigue  R53.83    Active

 

 Problem  Long term current use of insulin  Z79.4    Active

 

 Assessment  Skin irritation  R23.8    Active

 

 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

 

 Problem  Hyperglycemia  R73.9    Active

 

 Problem  Body mass index (BMI) 23.0-23.9,  Z68.23    Active



   adult      

 

 Problem  Seasonal allergies  J30.2    Active

 

 Problem  Gastrointestinal bleeding  K92.2    Active

 

 Problem  GERD (gastroesophageal reflux  K21.9    Active



   disease)      

 

 Problem  Back pain, lumbosacral  M54.5    Active

 

 Problem  Diabetes  E11.9    Active







Medications







 Medication  Code  Code  Instructions  Start  End  Status  Dosage



   System      Date  Date    

 

 Silvadene  NDC  92149790794  1 % Externally      Active  1 application



       Once a day        to affected



               area

 

 Clotrimazole-  NDC  74653762447  1-0.05 %  Dec 14,    Active  1 application



 Betamethasone      Externally  2018      to affected



       Twice a day        area

 

 Zyrtec  NDC  [id number]  10 MG Orally      Active  1 tablet



 Allergy      Once a day        

 

 Lisinopril  NDC  89341674469  5 MG Orally      Active  1 tablet



       Once a day        

 

 Jardiance  NDC  80315970603  25 MG Orally      Active  1 tablet



       Once a day        

 

 Metformin HCl  NDC  18874750255  1000 MG Orally      Active  1 tablet with



       Twice a day        meals

 

 Aspirin  NDC  25232023595  81 MG Orally      Active  1 tablet



       Once a day        

 

 Jardiance  NDC  85134672833  25 MG Orally      Active  1 tablet



       Once a day        

 

 FreeStyle  NDC  85062870194  -  Oct 24,    Active  as directed



 Tommy 14 Day        2018      



 Sensor              

 

 FreeStyle  NDC  40197120016  -  Oct 24,    Active  as directed



 Tommy 14 Day        2018      



 [place]              

 

 Januvia  NDC  77302563102  100 MG Orally      Active  1 tablet



       Once a day        

 

 Tresiba  NDC  17606510098  100 UNIT/ML  ,    Active  inject 25



 FlexTouch      Subcutaneous  2019      units



       Once a day        

 

 Metformin HCl  NDC  [id number]  1000 MG Orally      Active  1 tablet with



       Twice a day        meals

 

 Omeprazole  NDC  60737571623  40 MG Orally      Active  1 capsule



       Once a day        

 

 Lisinopril  NDC  23025298137  5 MG Orally      Active  1 tablet



       Once a day        







Results

No Known Results



Summary Purpose

[employer] Submission

## 2020-02-17 NOTE — XMS REPORT
[facility]

 Created on:2019



Patient:Giuseppe Sorto

Sex:Male

:1980

External Reference #:[id number]





Demographics







 Address  [address] [place] [address]

 

 Phone  [phone]

 

 Preferred Language  en

 

 Marital Status  Unknown

 

 Mormonism Affiliation  Unknown

 

 Race  White

 

 Ethnic Group  Unknown









Author







 Organization  [employer]









Care Team Providers







 Name  Role  Phone

 

 Burgos, Na  Provider Role  Unavailable









Allergies

No Known Allergies



Problems







 Problem Type  Condition  Code  Onset Dates  Condition Status

 

 Problem  Type 2 diabetes mellitus with  E11.65    Active



   hyperglycemia      

 

 Problem  Fatigue  R53.83    Active

 

 Problem  Long term current use of insulin  Z79.4    Active

 

 Problem  Hyperglycemia  R73.9    Active

 

 Problem  Body mass index (BMI) 23.0-23.9,  Z68.23    Active



   adult      

 

 Problem  Seasonal allergies  J30.2    Active

 

 Problem  Gastrointestinal bleeding  K92.2    Active

 

 Problem  GERD (gastroesophageal reflux  K21.9    Active



   disease)      

 

 Problem  Back pain, lumbosacral  M54.5    Active

 

 Problem  Diabetes  E11.9    Active







Medications

No Known Medications



Results

No Known Results



Summary Purpose

eClinicalWorks Submission

## 2020-02-17 NOTE — XMS REPORT
[facility]

 Created on:2019



Patient:Giuseppe Sorto

Sex:Male

:1980

External Reference #:[id number]





Demographics







 Address  [address] [place] [address]

 

 Phone  [phone]

 

 Preferred Language  en

 

 Marital Status  Unknown

 

 Uatsdin Affiliation  Unknown

 

 Race  White

 

 Ethnic Group  Unknown









Author







 Organization  [employer]









Care Team Providers







 Name  Role  Phone

 

 Dolly Schmidth  Provider Role  Unavailable









Allergies, Adverse Reactions, Alerts







 Substance  Reaction  Event Type

 

 N.K.D.A.  Info Not Available  Non Drug Allergy







Problems







 Problem Type  Condition  Code  Onset Dates  Condition Status

 

 Problem  Fatigue  R53.83    Active

 

 Problem  Gastrointestinal bleeding  K92.2    Active

 

 Problem  GERD (gastroesophageal reflux  K21.9    Active



   disease)      

 

 Problem  Mixed hyperlipidemia  E78.2    Active

 

 Assessment  Noncompliance with dietary  Z91.11    Active



   restriction      

 

 Problem  Seasonal allergies  J30.2    Active

 

 Problem  Noncompliance with dietary  Z91.11    Active



   restriction      

 

 Problem  Back pain, lumbosacral  M54.5    Active

 

 Problem  Diabetes  E11.9    Active

 

 Problem  Hyperglycemia  R73.9    Active

 

 Problem  Body mass index (BMI) 23.0-23.9,  Z68.23    Active



   adult      

 

 Assessment  Body mass index (BMI) 23.0-23.9,  Z68.23    Active



   adult      

 

 Assessment  GERD (gastroesophageal reflux  K21.9    Active



   disease)      

 

 Assessment  Mixed hyperlipidemia  E78.2    Active

 

 Assessment  Microalbuminuria  R80.9    Active

 

 Assessment  Type 2 diabetes mellitus with  E11.65    Active



   hyperglycemia      

 

 Assessment  Seasonal allergies  J30.2    Active

 

 Problem  Type 2 diabetes mellitus with  E11.65    Active



   hyperglycemia      

 

 Assessment  Long term current use of insulin  Z79.4    Active

 

 Problem  Long term current use of insulin  Z79.4    Active







Medications







 Medication  Code  Code  Instructions  Start  End  Status  Dosage



   System      Date  Date    

 

 Clotrimazole-B  NDC  83172466997  1-0.05 %  Dec 14,    Active  1 application



 etamethasone      Externally Twice  2018      to affected



       a day        area

 

 Aspirin  ND  29527015628  81 MG Orally Once      Active  1 tablet



       a day        

 

 FreeStyle  NDC  85039545721  -  Oct 24,    Active  as directed



 Tommy 14 Day        2018      



 [place]              

 

 FreeStyle  NDC  15269034025  -  Oct 24,    Active  as directed



 Tommy 14 Day        2018      



 Sensor              

 

 Omeprazole  NDC  50768839227  40 MG Orally Once      Active  1 capsule



       a day        

 

 Jardiance  NDC  24212411690  25 MG Orally Once      Active  1 tablet



       a day        

 

 Silvadene  NDC  58935456078  1 % Externally      Active  1 application



       Once a day        to affected



               area

 

 Nateglinide  NDC  12169728462  120 MG Orally      Active  1 tablet



       Three times a day        before meals

 

 BD Ultra-Fine  NDC  96250874380  4mm x 32Gm      Active  1 needle with



 Hilda Pen      subcutaneously  24,      Tresiba pen



 Needles      once a day        

 

 Tresiba  NDC  24415265273  100 UNIT/ML      Active  inject 23



 FlexTouch      Subcutaneous Once        units



       a day        

 

 Omeprazole  NDC  94892591454  40 MG Orally Once      Active  1 capsule



       a day        

 

 Metformin HCl  NDC  75911359807  1000 MG Orally      Active  1 tablet with



       Twice a day        meals

 

 Lisinopril  NDC  74212444329  5 MG Orally Once      Active  1 tablet



       a day        

 

 Zyrtec Allergy  NDC  77184947684  10 MG Orally Once      Active  1 tablet



       a day        







Results

No Known Results



Summary Purpose

eClinicalWorks Submission

## 2020-02-17 NOTE — XMS REPORT
[facility]

 Created on:2019



Patient:Giuseppe Sorto

Sex:Male

:1980

External Reference #:[id number]





Demographics







 Address  [address] [place] [address]

 

 Phone  Unavailable

 

 Preferred Language  en

 

 Marital Status  Unknown

 

 Adventist Affiliation  Unknown

 

 Race  White

 

 Ethnic Group  Unknown









Author







 Organization  [employer]ClinicalWorks









Care Team Providers







 Name  Role  Phone

 

 Brayan Kenan  Provider Role  Unavailable









Allergies

No Known Allergies



Problems







 Problem Type  Condition  Code  Onset Dates  Condition Status

 

 Problem  Type 2 diabetes mellitus with  E11.65    Active



   hyperglycemia      

 

 Problem  Fatigue  R53.83    Active

 

 Problem  Long term current use of insulin  Z79.4    Active

 

 Problem  Hyperglycemia  R73.9    Active

 

 Problem  Body mass index (BMI) 23.0-23.9,  Z68.23    Active



   adult      

 

 Problem  Seasonal allergies  J30.2    Active

 

 Problem  Gastrointestinal bleeding  K92.2    Active

 

 Problem  GERD (gastroesophageal reflux  K21.9    Active



   disease)      

 

 Problem  Back pain, lumbosacral  M54.5    Active

 

 Problem  Diabetes  E11.9    Active







Medications







 Medication  Code  Code  Instructions  Start  End  Status  Dosage



   System      Date  Date    

 

 BD Ultra-Fine  NDC  92580958507  4mm x 32Gm  ,    Active  1 needle



 Hilda Pen      subcutaneously  2019      with



 Needles      once a day        Tresiba



               pen







Results

No Known Results



Summary Purpose

eClinicalWorks Submission

## 2020-02-17 NOTE — XMS REPORT
[facility]

 Created on:2019



Patient:Giuseppe Sorto

Sex:Male

:1980

External Reference #:[id number]





Demographics







 Address  [address] [place] [address]

 

 Phone  [phone]

 

 Preferred Language  en

 

 Marital Status  Unknown

 

 Tenriism Affiliation  Unknown

 

 Race  White

 

 Ethnic Group  Unknown









Author







 Organization  [employer]Works









Care Team Providers







 Name  Role  Phone

 

 Burgos, Na  Provider Role  Unavailable









Allergies, Adverse Reactions, Alerts







 Substance  Reaction  Event Type

 

 N.K.D.A.  Info Not Available  Non Drug Allergy







Problems







 Problem Type  Condition  Code  Onset Dates  Condition Status

 

 Problem  Type 2 diabetes mellitus with  E11.65    Active



   hyperglycemia      

 

 Problem  Fatigue  R53.83    Active

 

 Problem  Long term current use of insulin  Z79.4    Active

 

 Problem  Hyperglycemia  R73.9    Active

 

 Problem  Body mass index (BMI) 23.0-23.9,  Z68.23    Active



   adult      

 

 Problem  Seasonal allergies  J30.2    Active

 

 Problem  Gastrointestinal bleeding  K92.2    Active

 

 Problem  GERD (gastroesophageal reflux  K21.9    Active



   disease)      

 

 Problem  Back pain, lumbosacral  M54.5    Active

 

 Problem  Diabetes  E11.9    Active

 

 Assessment  Hyperglycemia  R73.9    Active

 

 Assessment  Seasonal allergies  J30.2    Active

 

 Assessment  Body mass index (BMI) 23.0-23.9,  Z68.23    Active



   adult      

 

 Assessment  GERD (gastroesophageal reflux  K21.9    Active



   disease)      

 

 Assessment  Long term current use of insulin  Z79.4    Active

 

 Assessment  Type 2 diabetes mellitus with  E11.65    Active



   hyperglycemia      

 

 Assessment  Acute effusion of left ear  H65.192    Active

 

 Assessment  Back pain, lumbosacral  M54.5    Active

 

 Assessment  Contact dermatitis due to  L23.1    Active



   adhesives, unspecified contact      



   dermatitis type      







Medications







 Medication  Code  Code  Instructions  Start  End  Status  Dosage



   System      Date  Date    

 

 Metformin HCl  NDC  51727714725  1000 MG Orally      Active  1 tablet with



       Twice a day        meals

 

 Lisinopril  NDC  27269918564  5 MG Orally      Active  1 tablet



       Once a day        

 

 Lantus  NDC  32741061827  100 UNIT/ML      Active  30 units



 SoloStar      Subcutaneous        



       once daily        

 

 FreeStyle  NDC  88104321944  -  Oct 24,    Active  as directed



 Tommy 14 Day        2018      



 Sensor              

 

 Clotrimazole-  NDC  08231937122  1-0.05 %  Dec 14,    Active  1 application



 Betamethasone      Externally  2018      to affected



       Twice a day        area

 

 Zyrtec  NDC  05387638215  10 MG Orally      Active  1 tablet



 Allergy      Once a day        

 

 Silvadene  NDC  11354569843  1 % Externally      Active  1 application



       Once a day        to affected



               area

 

 Jardiance  NDC  39188289076  25 MG Orally      Active  1 tablet



       Once a day        

 

 Omeprazole  NDC  34673605359  40 MG Orally      Active  1 capsule



       Once a day        

 

 FreeStyle  NDC  65749855004  -  Oct 24,    Active  as directed



 Tommy 14 Day        2018      



 [place]              

 

 Januvia  NDC  89889952222  100 MG Orally      Active  1 tablet



       Once a day        

 

 Aspirin  NDC  55824030271  81 MG Orally      Active  1 tablet



       Once a day        







Results

No Known Results



Summary Purpose

eClinicalWorks Submission

## 2020-02-17 NOTE — XMS REPORT
[facility]

 Created on:August 15, 2019



Patient:Giuseppe Sorto

Sex:Male

:1980

External Reference #:[id number]





Demographics







 Address  [address] [place] [address]

 

 Phone  Unavailable

 

 Preferred Language  en

 

 Marital Status  Unknown

 

 Confucianist Affiliation  Unknown

 

 Race  White

 

 Ethnic Group  Unknown









Author







 Organization  eClinicalWorks









Care Team Providers







 Name  Role  Phone

 

 Brayan Kenan  Provider Role  Unavailable









Allergies, Adverse Reactions, Alerts







 Substance  Reaction  Event Type

 

 N.K.D.A.  Info Not Available  Non Drug Allergy







Problems







 Problem Type  Condition  Code  Onset Dates  Condition Status

 

 Problem  Long term current use of insulin  Z79.4    Active

 

 Problem  GERD (gastroesophageal reflux  K21.9    Active



   disease)      

 

 Problem  Fatigue  R53.83    Active

 

 Problem  Seasonal allergies  J30.2    Active

 

 Problem  Hyperglycemia  R73.9    Active

 

 Problem  Mixed hyperlipidemia  E78.2    Active

 

 Problem  Diabetes  E11.9    Active

 

 Problem  Gastrointestinal bleeding  K92.2    Active

 

 Problem  Body mass index (BMI) 23.0-23.9,  Z68.23    Active



   adult      

 

 Problem  Back pain, lumbosacral  M54.5    Active

 

 Assessment  Microalbuminuria  R80.9    Active

 

 Assessment  Body mass index (BMI) 23.0-23.9,  Z68.23    Active



   adult      

 

 Assessment  Mixed hyperlipidemia  E78.2    Active

 

 Assessment  Long term current use of insulin  Z79.4    Active

 

 Assessment  Type 2 diabetes mellitus with  E11.65    Active



   hyperglycemia      

 

 Assessment  GERD (gastroesophageal reflux  K21.9    Active



   disease)      

 

 Assessment  Seasonal allergies  J30.2    Active

 

 Problem  Type 2 diabetes mellitus with  E11.65    Active



   hyperglycemia      







Medications







 Medication  Code  Code  Instructions  Start  End  Status  Dosage



   System      Date  Date    

 

 Clotrimazole-B  NDC  94244883623  1-0.05 %  Dec 14,    Active  1 application



 etamethasone      Externally Twice        to affected



       a day        area

 

 FreeStyle  NDC  23170557411  -  Oct 24,    Active  as directed



 Tommy 14 Day        2018      



 [place]              

 

 Januvia  NDC  53511175808  100 MG Orally      Active  1 tablet



       Once a day        

 

 Lisinopril  NDC  77217006297  5 MG Orally Once      Active  1 tablet



       a day        

 

 BD Ultra-Fine  NDC  52912316597  4mm x 32Gm      Active  1 needle with



 Hilda Pen      subcutaneously  ,      Tresiba pen



 Needles      once a day        

 

 Omeprazole  NDC  47435776407  40 MG Orally Once      Active  1 capsule



       a day        

 

 Zyrtec Allergy  NDC  78956771037  10 MG Orally Once      Active  1 tablet



       a day        

 

 Silvadene  NDC  70108701302  1 % Externally      Active  1 application



       Once a day        to affected



               area

 

 Metformin HCl  NDC  31899373627  1000 MG Orally      Active  1 tablet with



       Twice a day        meals

 

 Jardiance  NDC  01310266310  25 MG Orally Once    Feb  Active  1 tablet



       a day    2020    

 

 FreeStyle  NDC  44467594652  -  Oct 24,    Active  as directed



 Tommy 14 Day        2018      



 [place]              

 

 Tresiba  NDC  87887185824  100 UNIT/ML      Active  inject 23



 FlexTouch      Subcutaneous Once  17,      units



       a day        

 

 Omeprazole  NDC  97897904105  40 MG Orally Once      Active  1 capsule



       a day        

 

 Nateglinide  NDC  43820264594  120 MG Orally  Aug 15,    Active  1 tablet



       Three times a day        before meals

 

 Aspirin  NDC  58222733458  81 MG Orally Once      Active  1 tablet



       a day        







Results







 Name  Result  Date  Reference Range  Unit  Abnormality Flag

 

 HEMOGLOBIN A1C          

 

 ----A1C  7.9  [id number]      







Summary Purpose

eClinicalWorks Submission

## 2020-02-17 NOTE — XMS REPORT
[facility]

 Created on:2019



Patient:Giuseppe Sorto

Sex:Male

:1980

External Reference #:[id number]





Demographics







 Address  [address] [place] [address]

 

 Phone  [phone]

 

 Preferred Language  en

 

 Marital Status  Unknown

 

 Druze Affiliation  Unknown

 

 Race  White

 

 Ethnic Group  Unknown









Author







 Organization  eClinicalWorks









Care Team Providers







 Name  Role  Phone

 

 Burgos, Na  Provider Role  Unavailable









Allergies

No Known Allergies



Problems







 Problem Type  Condition  Code  Onset Dates  Condition Status

 

 Problem  Type 2 diabetes mellitus with  E11.65    Active



   hyperglycemia      

 

 Problem  Fatigue  R53.83    Active

 

 Problem  Long term current use of insulin  Z79.4    Active

 

 Problem  Hyperglycemia  R73.9    Active

 

 Problem  Body mass index (BMI) 23.0-23.9,  Z68.23    Active



   adult      

 

 Problem  Seasonal allergies  J30.2    Active

 

 Problem  Gastrointestinal bleeding  K92.2    Active

 

 Problem  GERD (gastroesophageal reflux  K21.9    Active



   disease)      

 

 Problem  Back pain, lumbosacral  M54.5    Active

 

 Problem  Diabetes  E11.9    Active







Medications

No Known Medications



Results

No Known Results



Summary Purpose

eClinicalWorks Submission

## 2020-02-17 NOTE — XMS REPORT
[facility]

 Created on:2019



Patient:Giuseppe Sorto

Sex:Male

:1980

External Reference #:[id number]





Demographics







 Address  [address] [place] [address]

 

 Phone  [phone]

 

 Preferred Language  en

 

 Marital Status  Unknown

 

 Amish Affiliation  Unknown

 

 Race  White

 

 Ethnic Group  Unknown









Author







 Organization  eClinicalWorks









Care Team Providers







 Name  Role  Phone

 

 Brayan Kenan  Provider Role  Unavailable









Allergies, Adverse Reactions, Alerts







 Substance  Reaction  Event Type

 

 N.K.D.A.  Info Not Available  Non Drug Allergy







Problems







 Problem Type  Condition  Code  Onset Dates  Condition Status

 

 Problem  Type 2 diabetes mellitus with  E11.65    Active



   hyperglycemia      

 

 Problem  Fatigue  R53.83    Active

 

 Problem  Long term current use of insulin  Z79.4    Active

 

 Assessment  Noncompliance w/medication treatment  Z91.14    Active



   due to intermit use of medication      

 

 Assessment  Diabetic ketoacidosis without coma  E11.10    Active



   associated with type 2 diabetes      



   mellitus      

 

 Assessment  Type 2 diabetes mellitus with  E11.65    Active



   hyperglycemia      

 

 Problem  Hyperglycemia  R73.9    Active

 

 Problem  Body mass index (BMI) 23.0-23.9,  Z68.23    Active



   adult      

 

 Problem  Seasonal allergies  J30.2    Active

 

 Problem  Gastrointestinal bleeding  K92.2    Active

 

 Problem  GERD (gastroesophageal reflux  K21.9    Active



   disease)      

 

 Problem  Back pain, lumbosacral  M54.5    Active

 

 Problem  Diabetes  E11.9    Active







Medications







 Medication  Code  Code  Instructions  Start  End  Status  Dosage



   System      Date  Date    

 

 Jardiance  NDC  23737527420  25 MG Orally      Active  1 tablet



       Once a day        

 

 Lisinopril  NDC  28182625475  5 MG Orally      Active  1 tablet



       Once a day        

 

 FreeStyle  NDC  86519340532  -  Oct 24,    Active  as directed



 Tommy 14 Day        2018      



 [place]              

 

 Zyrtec  NDC  11645403657  10 MG Orally      Active  1 tablet



 Allergy      Once a day        

 

 Lantus  NDC  19392444000  100 UNIT/ML      Inactive  30 units



 SoloStar      Subcutaneous        



       once daily        

 

 Lisinopril  NDC  07511008856  5 MG Orally      Active  1 tablet



       Once a day        

 

 Metformin HCl  NDC  54586965313  1000 MG Orally      Active  1 tablet with



       Twice a day        meals

 

 Clotrimazole-  NDC  13188627820  1-0.05 %  Dec 14,    Active  1 application



 Betamethasone      Externally  2018      to affected



       Twice a day        area

 

 Aspirin  ND  61504954254  81 MG Orally      Active  1 tablet



       Once a day        

 

 Silvadene  NDC  42375786413  1 % Externally      Active  1 application



       Once a day        to affected



               area

 

 Januvia  NDC  56798276608  100 MG Orally      Active  1 tablet



       Once a day        

 

 FreeStyle  NDC  12450549227  -  Oct 24,    Active  as directed



 Tommy 14 Day        2018      



 [place]              

 

 Jardiance  NDC  13998856943  25 MG Orally      Active  1 tablet



       Once a day        

 

 Tresiba  NDC  30794988551  100 UNIT/ML  ,    Active  inject 25



 FlexTouch      Subcutaneous  2019      units



       Once a day        

 

 Metformin HCl  NDC  24640268855  1000 MG Orally      Active  1 tablet with



       Twice a day        meals

 

 Omeprazole  NDC  29503091731  40 MG Orally      Active  1 capsule



       Once a day        







Results

No Known Results



Summary Purpose

eClinicalWorks Submission

## 2020-02-17 NOTE — XMS REPORT
Patient Summary Document

 Created on:2020



Patient:HENOK SAMANO

Sex:Male

:1980

External Reference #:[id number]





Demographics







 Address  [address] [place] [address]

 

 Home Phone  [phone]

 

 Work Phone  [phone]

 

 Email Address  [email or web address]

 

 Preferred Language  Unknown

 

 Marital Status  Unknown

 

 Oriental orthodox Affiliation  Unknown

 

 Race  Unknown

 

 Additional Race(s)  Unavailable

 

 Ethnic Group  Unknown









Author







 Organization  [facility]

 

 Address  [address] [place] [address] [address]. Hernández [address] [place] [address]

 

 Phone  [phone]









Care Team Providers







 Name  Role  Phone

 

 Unavailable  Unavailable  Unavailable









Problems

This patient has no known problems.



Allergies, Adverse Reactions, Alerts

This patient has no known allergies or adverse reactions.



Medications

This patient has no known medications.

## 2020-02-18 VITALS — OXYGEN SATURATION: 96 %

## 2020-02-18 LAB
HCT VFR BLD CALC: 40.3 % (ref 39.6–49)
HCT VFR BLD CALC: 40.4 % (ref 39.6–49)
HCT VFR BLD CALC: 40.5 % (ref 39.6–49)
UA COMPLETE W REFLEX CULTURE PNL UR: (no result)

## 2020-02-18 PROCEDURE — 0DB68ZX EXCISION OF STOMACH, VIA NATURAL OR ARTIFICIAL OPENING ENDOSCOPIC, DIAGNOSTIC: ICD-10-PCS

## 2020-02-18 PROCEDURE — 0DB38ZX EXCISION OF LOWER ESOPHAGUS, VIA NATURAL OR ARTIFICIAL OPENING ENDOSCOPIC, DIAGNOSTIC: ICD-10-PCS

## 2020-02-18 RX ADMIN — HUMAN INSULIN SCH: 100 INJECTION, SOLUTION SUBCUTANEOUS at 11:30

## 2020-02-18 RX ADMIN — Medication SCH ML: at 08:05

## 2020-02-18 RX ADMIN — HUMAN INSULIN SCH UNIT: 100 INJECTION, SOLUTION SUBCUTANEOUS at 20:13

## 2020-02-18 RX ADMIN — DEXTROSE AND SODIUM CHLORIDE SCH MLS: 5; .9 INJECTION, SOLUTION INTRAVENOUS at 06:02

## 2020-02-18 RX ADMIN — DEXTROSE AND SODIUM CHLORIDE SCH: 5; .9 INJECTION, SOLUTION INTRAVENOUS at 16:00

## 2020-02-18 RX ADMIN — HUMAN INSULIN SCH UNIT: 100 INJECTION, SOLUTION SUBCUTANEOUS at 17:01

## 2020-02-18 RX ADMIN — SODIUM CHLORIDE SCH MLS: 0.9 INJECTION, SOLUTION INTRAVENOUS at 17:00

## 2020-02-18 RX ADMIN — PANTOPRAZOLE SODIUM SCH MG: 40 TABLET, DELAYED RELEASE ORAL at 17:00

## 2020-02-18 RX ADMIN — Medication SCH ML: at 20:14

## 2020-02-18 NOTE — ER
Nurse's Notes                                                                                     

 [facility]                                                                 

Name: Giuseppe Sorto                                                                          

Age: 40 yrs                                                                                       

Sex: Male                                                                                         

: 1980                                                                                   

MRN: [id number]                                                                                   

Arrival Date: 2020                                                                          

Time: 21:06                                                                                       

Account#: [id number]                                                                            

Bed 7                                                                                             

Private MD:                                                                                       

Diagnosis: Gastrointestinal hemorrhage, unspecified                                               

                                                                                                  

Presentation:                                                                                     

                                                                                             

21:21 Presenting complaint: Patient states: Pt reports he started vomiting at 1945, wife      ea  

      reports it was reddish brown. Pt reports he has a history of esophagitis but has not        

      had these symptoms in the past. Pt reports he drinks about 5 to 6 beers daily.              

      Transition of care: patient was not received from another setting of care. Onset of         

      symptoms. Risk Assessment: Do you want to hurt yourself or someone else? Patient            

      reports no desire to harm self or others. Initial Sepsis Screen: Does the patient meet      

      any 2 criteria? No. Patient's initial sepsis screen is negative. Does the patient have      

      a suspected source of infection? No. Patient's initial sepsis screen is negative. Care      

      prior to arrival: None.                                                                     

21:21 Method Of Arrival: Ambulatory                                                           ea  

21:21 Acuity: MATY 3                                                                           ea  

                                                                                                  

Triage Assessment:                                                                                

21:25 General: Appears uncomfortable, Behavior is appropriate for age. Pain: Denies pain. GI: ea  

      Reports nausea, vomiting.                                                                   

                                                                                                  

Historical:                                                                                       

- Allergies:                                                                                      

21:33 No Known Allergies;                                                                     ao  

- Home Meds:                                                                                      

21:25 metformin 1,000 mg Oral TG24 1 tab 2 times per day [Active]; omeprazole 40 mg Oral cpDR ea  

      1 cap once daily [Active]; lisinopril 5 mg Oral tab 1 tab once daily [Active]; Januvia      

      100 mg Oral tab 1 tab once daily [Active]; nateglinide 120 mg Oral tab 1 tab 3 times        

      per day [Active]; Jardiance 10 mg Oral tab 1 tab once daily [Active];                       

- PMHx:                                                                                           

21:25 Esophigitis; Diabetes - NIDDM;                                                          ea  

- PSHx:                                                                                           

21:25 None;                                                                                   ea  

                                                                                                  

- Immunization history:: Adult Immunizations up to date.                                          

- Coronavirus screen:: The patient has NOT traveled to [place] in the past 14 days.                 

- Social history:: Smoking status: Patient denies any tobacco usage or history of.                

  Patient uses alcohol, on a daily basis. claims drinking about a 6 pack/day.                     

- Ebola Screening: : No symptoms or risks identified at this time.                                

                                                                                                  

                                                                                                  

Screenin:23 Abuse screen: Denies threats or abuse. Nutritional screening: No deficits noted.        ea  

      Tuberculosis screening: No symptoms or risk factors identified. Fall Risk None              

      identified.                                                                                 

                                                                                                  

Assessment:                                                                                       

21:39 General: Appears in no apparent distress. comfortable, Behavior is calm, cooperative,   ao  

      appropriate for age. Neuro: Level of Consciousness is awake, alert, obeys commands,         

      Oriented to person, place, time, situation, Appropriate for age Moves all extremities.      

      Full function Speech is normal. Cardiovascular: Reports None. Respiratory: Airway is        

      patent Respiratory effort is even, unlabored, Respiratory pattern is regular,               

      symmetrical. GI: Abdomen is non-distended. : No signs and/or symptoms were reported       

      regarding the genitourinary system. EENT: No signs and/or symptoms were reported            

      regarding the EENT system. Derm: No signs and/or symptoms reported regarding the            

      dermatologic system. Skin is intact, Skin is pink, warm \T\ dry. normal, Skin temperature   

      is warm. Musculoskeletal: Circulation, motion, and sensation intact. Range of motion:       

      intact in all extremities.                                                                  

22:45 Reassessment: Patient appears in no apparent distress at this time. Patient and/or      ao  

      family updated on plan of care and expected duration. Pain level reassessed. Patient to     

      CT.                                                                                         

23:50 Reassessment: Patient appears in no apparent distress at this time. Patient and/or      ao  

      family updated on plan of care and expected duration. Pain level reassessed. Protonix       

      drip started.                                                                               

                                                                                             

00:34 Reassessment: Patient appears in no apparent distress at this time. Patient and/or      ao  

      family updated on plan of care and expected duration. Pain level reassessed. Patient to     

      stay in the hospital. Patient agree with POC. Hospitalist at bedside at this time           

      assessing patient.                                                                          

01:30 Reassessment: Patient appears in no apparent distress at this time. Patient and/or      ao  

      family updated on plan of care and expected duration. Pain level reassessed.                

02:19 Reassessment: Report given to EDWARD Jerome. Patient o be taking to ICU bed 1.           ao  

                                                                                                  

Vital Signs:                                                                                      

                                                                                             

21:23  / 76; Pulse 82; Resp 18; Temp 98.3; Pulse Ox 97% on R/A; Weight 79.38 kg; Height ea  

      6 ft. 0 in. (182.88 cm);                                                                    

22:44  / 82; Pulse 82; Resp 18; Pulse Ox 99% on R/A;                                    ao  

0218                                                                                             

00:34  / 74; Pulse 68; Resp 18; Pulse Ox 98% on R/A;                                    ao  

01:30  / 76; Pulse 78; Resp 16; Pulse Ox 97% on R/A;                                    ao  

02:20  / 62; Pulse 62; Resp 18; Pulse Ox 100% on R/A;                                   ao  

                                                                                             

21:23 Body Mass Index 23.73 (79.38 kg, 182.88 cm)                                             ea  

                                                                                                  

ED Course:                                                                                        

                                                                                             

21:06 Patient arrived in ED.                                                                  ag3 

21:23 Triage completed.                                                                       ea  

21:23 Arm band placed on right wrist. Patient placed in an exam room, on a stretcher, on      ea  

      pulse oximetry.                                                                             

21:30 Patient has correct armband on for positive identification. Placed in gown. Bed in low  ea  

      position. Call light in reach. Adult w/ patient.                                            

21:32 Clark, Bernard, RN is Primary Nurse.                                                       ao  

21:37 Omer Lee NP is PHCP.                                                           pm1 

21:41 Fred Willard MD is Attending Physician.                                           pm1 

21:47 Inserted saline lock: 20 gauge in right antecubital area, using aseptic technique.      ao  

      Blood collected.                                                                            

22:57 CT Abd/Pelvis - IV Contrast Only In Process Unspecified.                                EDMS

23:50 Inserted saline lock: 22 gauge in left hand, using aseptic technique.                   ao  

                                                                                             

00:25 Eben Carias is Hospitalizing Provider.                                               pm1 

02:20 No provider procedures requiring assistance completed. Patient admitted, IV remains in  ao  

      place.                                                                                      

                                                                                                  

Administered Medications:                                                                         

                                                                                             

22:31 Drug: Zofran 4 mg Route: IVP; Site: right antecubital;                                  ao  

                                                                                             

00:25 Follow up: Response: No adverse reaction                                                ao  

                                                                                             

22:31 Drug: ProTONIX 40 mg Route: IVP; Site: left antecubital;                                ao  

                                                                                             

00:25 Follow up: Response: No adverse reaction                                                ao  

                                                                                             

22:32 Drug: NS 0.9% 1000 ml Route: IV; Rate: 1000 ml; Site: right antecubital;                ao  

                                                                                             

00:25 Follow up: IV Status: Completed infusion; IV Intake: 1000ml                             ao  

                                                                                             

23:51 Drug: ProTONIX 8 mg/hr Route: IV; Rate: 25 ml/hr; Site: left hand;                      ao  

                                                                                             

01:58 Follow up: IV Status: Infusion continued upon admission                                 ao  

                                                                                             

23:51 Drug: Octreotide 50 mcg Route: IV; Rate: calculated rate; Site: right antecubital;      ao  

                                                                                             

00:25 Follow up: Response: No adverse reaction; IV Status: Completed infusion                 ao  

00:04 Drug: Octreotide Infusion (50 mcg/hr) - (Octreotide 500 mcg, NS 0.9% 500 ml) Route: IV; ao  

      Rate: 50 ml/hr; Site: left hand;                                                            

01:58 Follow up: IV Status: Infusion continued upon admission                                 ao  

00:12 Drug: Zofran 4 mg Route: IVP; Site: right antecubital;                                  ao  

00:26 Follow up: Response: No adverse reaction                                                ao  

                                                                                                  

                                                                                                  

Point of Care Testing:                                                                            

      Guaiac:                                                                                     

                                                                                             

22:45 Emesis Guaiac: Positive; Emesis Hemoccult Control: Pass                                 ao  

Intake:                                                                                           

                                                                                             

00:25 IV: 1000ml; Total: 1000ml.                                                              ao  

                                                                                                  

Outcome:                                                                                          

00:25 Decision to Hospitalize by Provider.                                                    pm1 

02:20 Admitted to ICU accompanied by nurse, room 1, with chart, Report called to  sole Jerome RN                                                                                          

02:20 Condition: stable                                                                           

02:20 Instructed on the need for admit.                                                           

02:24 Patient left the ED.                                                                    ao  

                                                                                                  

Signatures:                                                                                       

Dispatcher [employer]                           EDMS                                                 

Bernard Clark RN RN ao Marinas, Patrick, ANTHONY                    NP   pm1                                                  

Chantal Bowens RN RN ea Gomez, Alice                                 ag3                                                  

                                                                                                  

Corrections: (The following items were deleted from the chart)                                    

                                                                                             

21:31 21:21 Presenting complaint: Patient states: Pt reports he started vomiting at 1945,     ea  

      wife reports it was reddish brown. Pt reports he has a history of esophigitis but has       

      not had these symptoms in the past ea                                                       

                                                                                                  

**************************************************************************************************

## 2020-02-18 NOTE — P.HP
Certification for Inpatient


Patient admitted to: Inpatient


With expected LOS: >2 Midnights


Practitioner: I am a practitioner with admitting privileges, knowledge of 

patient current condition, hospital course, and medical plan of care.


Services: Services provided to patient in accordance with Admission 

requirements found in Title 42 Section 412.3 of the Code of Federal Regulations





Patient History


Date of Service: 02/18/20


Reason for admission: Hematemesis


History of Present Illness: 


40-year-old gentleman with a history of insulin-dependent diabetes mellitus, 

history of esophagitis presented to the emergency department with a complaint 

of nausea and vomiting blood.  He described a coffee-ground vomit, mixed with 

food that he just ate.  This was followed by further episodes of bright red 

blood vomit.  He reports experiencing abdominal pain 1 week ago which resolved 

before the vomit.  This is his 2nd episode of hematemesis.  1st episode was in 

2015 where patient was diagnosis with esophagitis by EGD.  CT abdomen and 

pelvis done today in the ED shows no acute disease, no liver cirrhosis.  

Patient drinks about 4-5 bottles of beer every day.  He denies any history of 

alcohol withdrawal.  He reports normal colored stool for the last couple of 

days. Patient is admitted for further management.





Allergies





No Known Allergies Allergy (Verified 02/18/20 04:06)


 





Home Medications: 








Aspirin [Children's Aspirin] 1 tab PO DAILY 02/25/15 


Esomeprazole Mag Trihydrate [Nexium] 40 mg PO DAILY 02/25/15 


Metformin HCl [Glucophage] 1,000 mg PO BID 02/25/15 


Sitagliptin Phosphate [Januvia*] 100 mg PO DAILY 02/25/15 


lisinopriL [Prinivil*] 5 mg PO DAILY 02/25/15 


Insulin Degludec [Tresiba Flextouch U-100] 30 units SQ DAILY 02/18/20 


Nateglinide [Starlix] 1 tab PO TID 02/18/20 








- Past Medical/Surgical History


Diabetic: Yes


-: DM II


-: Hypertension


-: GERD


-: Esophagitis


-: vasectomy


Psychosocial/ Personal History: Patient lives at home.





- Family History


  ** Mother


-: Diabetes, Cancer





  ** Father


-: Cancer





- Social History


Alcohol use: Yes


CD- Drugs: No


Caffeine use: Yes





Review of Systems


Other: 


Except as documented, all other systems reviewed and negative.








Physical Examination





- Physical Exam


General: Alert, In no apparent distress, Oriented x3


HEENT: Atraumatic, Normocephalic, PERRLA, Mucous membr. moist/pink, Sclerae 

nonicteric


Neck: Supple, JVD not distended, No Thyromegaly


Respiratory: Clear to auscultation bilaterally, Normal air movement


Cardiovascular: No edema, Normal pulses, Regular rate/rhythm, Normal S1 S2


Capillary refill: <2 Seconds


Gastrointestinal: Normal bowel sounds, Soft and benign, Non-distended, No 

tenderness


Musculoskeletal: No swelling, No erythema


Integumentary: No rashes, No erythema


Neurological: Normal speech, Normal strength at 5/5 x4 extr





- Studies


Laboratory Data (last 24 hrs)





02/17/20 21:45: PT 10.3, INR 0.87


02/17/20 21:45: Creatinine 1.03


02/17/20 21:45: WBC 7.2, Hgb 15.4, Hct 45.3, Plt Count 257


02/17/20 21:45: Sodium 138, Potassium 3.6, BUN 12, Creatinine 1.05, Glucose 317 

H, Total Bilirubin 0.3, AST 17, ALT 21, Alkaline Phosphatase 54, Lipase 211





Microbiology Data (last 24 hrs): 








02/17/20 22:50   Gastric Aspirate   Gastric Occult Blood - Final








Assessment and Plan





- Problems (Diagnosis)


(1) Upper GI bleed


Current Visit: Yes   Status: Acute   





(2) Esophagitis


Current Visit: Yes   Status: Acute   





(3) Diabetes mellitus


Current Visit: No   Status: Chronic   


Qualifiers: 


   Diabetes mellitus type: type 2   Diabetes mellitus long term insulin use: 

with long term use   Diabetes mellitus complication status: with other 

specified complication   Qualified Code(s): E11.69 - Type 2 diabetes mellitus 

with other specified complication; Z79.4 - Long term (current) use of insulin   





- Plan


Admit to general medical floor


Keep NPO


Start IV Protonix and octreotide drip


IV hydration with normal saline


Monitor H&H q.6 hr and transfuse p.r.n.


Watch for active bleeding.


GI consult- Dr. Springer made aware.


Hold long-acting insulin


Manage blood sugar with insulin sliding scale.








- Advance Directives


Does patient have a Living Will: No


Does patient have a Durable POA for Healthcare: No

## 2020-02-18 NOTE — P.PN
Subjective


Date of Service: 02/18/20


Chief Complaint: Hematemesis


Subjective: Improving





Patient seen and examined chart reviewed and case discussed with RN.  Patient 

going for an EGD this morning.  Has not had any further hematemesis.  Did have 

low blood sugar level.  States he drinks 4-5 drinks every day





Review of Systems


10-point ROS is otherwise unremarkable


Gastrointestinal: As per HPI





Physical Examination





- Vital Signs


Temperature: 97.7 F


Blood Pressure: 119/82


Pulse: 66


Respirations: 18


Pulse Ox (%): 99





- Physical Exam


General: Alert, Oriented x3, Mild distress, Other (Ill-appearing male)


HEENT: Atraumatic, PERRLA, Other (Dry mucous membranes), EOMI


Neck: Supple, JVD not distended


Respiratory: Clear to auscultation bilaterally, Normal air movement


Cardiovascular: No edema, Normal pulses, Regular rate/rhythm, Normal S1 S2


Gastrointestinal: Normal bowel sounds, Soft and benign, Non-distended, No 

tenderness


Musculoskeletal: No tenderness


Integumentary: No rashes, No erythema


Neurological: Normal speech, Normal strength at 5/5 x4 extr, Normal tone, 

Normal affect





- Studies


Laboratory Data (last 24 hrs)





02/17/20 21:45: PT 10.3, INR 0.87


02/17/20 21:45: Creatinine 1.03


02/17/20 21:45: WBC 7.2, Hgb 15.4, Hct 45.3, Plt Count 257


02/17/20 21:45: Sodium 138, Potassium 3.6, BUN 12, Creatinine 1.05, Glucose 317 

H, Total Bilirubin 0.3, AST 17, ALT 21, Alkaline Phosphatase 54, Lipase 211





Microbiology Data (last 24 hrs): 








02/17/20 22:50   Gastric Aspirate   Gastric Occult Blood - Final





Medications List Reviewed: Yes





Assessment And Plan





- Current Problems (Diagnosis)


(1) Upper GI bleed


Current Visit: Yes   Status: Acute   





(2) Esophagitis


Current Visit: Yes   Status: Acute   





(3) Diabetes mellitus


Current Visit: No   Status: Chronic   


Qualifiers: 


   Diabetes mellitus type: type 2   Diabetes mellitus long term insulin use: 

with long term use   Diabetes mellitus complication status: with hypoglycemia   

Diabetes mellitus complication detail: without coma   Qualified Code(s): 

E11.649 - Type 2 diabetes mellitus with hypoglycemia without coma; Z79.4 - Long 

term (current) use of insulin   





(4) Alcohol dependence


Current Visit: Yes   Status: Acute   


Qualifiers: 


   Substance use status: uncomplicated   Qualified Code(s): F10.20 - Alcohol 

dependence, uncomplicated   





- Plan


Upper GI bleed likely due to esophageal varices from chronic alcoholism.


Anticipate EGD this morning.


Serial H&H has been stable.  Slight decreased from his baseline.  No further 

hematemesis.  Will step down from ICU


Continue Protonix drip


Monitor for signs of alcohol withdrawal using CIWA protocol


Ativan P.R.N.


Multi-vitamins


Patient counseled regarding alcohol cessation.  Has no plans for AA or rehab

## 2020-02-18 NOTE — EDPHYS
Physician Documentation                                                                           

 [facility]                                                                 

Name: Giuseppe Sorto                                                                          

Age: 40 yrs                                                                                       

Sex: Male                                                                                         

: 1980                                                                                   

MRN: [id number]                                                                                   

Arrival Date: 2020                                                                          

Time: 21:06                                                                                       

Account#: [id number]                                                                            

Bed 7                                                                                             

Private MD:                                                                                       

ED Physician Fred Willard                                                                    

HPI:                                                                                              

                                                                                             

21:47 This 40 yrs old  Male presents to ER via Ambulatory with complaints of         pm1 

      Vomiting.                                                                                   

21:47 The patient presents to the emergency department with vomiting, described as coffee     pm1 

      ground in nature. Onset: The symptoms/episode began/occurred today. Possible causes:        

      unknown, history of esophagitis and alcoholic. The symptoms are aggravated by nothing.      

      The symptoms are alleviated by nothing. Associated signs and symptoms: Pertinent            

      negatives: abdominal pain, constipation, diarrhea, fever. Severity of symptoms: in the      

      emergency department the symptoms have improved Pain is currently a 0 / 10. The patient     

      has experienced a previous episode, 4 years ago dark BM and was diagnosed with              

      esophagitis as source of bleeding. The patient has not recently seen a physician, the       

      patient's primary care provider is Dr. Schmidt. Patient reports total of one vomit bag        

      quantity at home.                                                                           

                                                                                                  

Historical:                                                                                       

- Allergies:                                                                                      

21:33 No Known Allergies;                                                                     ao  

- Home Meds:                                                                                      

21:25 metformin 1,000 mg Oral TG24 1 tab 2 times per day [Active]; omeprazole 40 mg Oral cpDR ea  

      1 cap once daily [Active]; lisinopril 5 mg Oral tab 1 tab once daily [Active]; Januvia      

      100 mg Oral tab 1 tab once daily [Active]; nateglinide 120 mg Oral tab 1 tab 3 times        

      per day [Active]; Jardiance 10 mg Oral tab 1 tab once daily [Active];                       

- PMHx:                                                                                           

21:25 Esophigitis; Diabetes - NIDDM;                                                          ea  

- PSHx:                                                                                           

21:25 None;                                                                                   ea  

                                                                                                  

- Immunization history:: Adult Immunizations up to date.                                          

- Coronavirus screen:: The patient has NOT traveled to [place] in the past 14 days.                 

- Social history:: Smoking status: Patient denies any tobacco usage or history of.                

  Patient uses alcohol, on a daily basis. claims drinking about a 6 pack/day.                     

- Ebola Screening: : No symptoms or risks identified at this time.                                

                                                                                                  

                                                                                                  

ROS:                                                                                              

21:47 Constitutional: Negative for fever, chills, and weight loss, Cardiovascular: Negative   pm1 

      for chest pain, palpitations, and edema, Respiratory: Negative for shortness of breath,     

      cough, wheezing, and pleuritic chest pain.                                                  

21:47 Back: Negative for injury and pain, MS/Extremity: Negative for injury and deformity,        

      Skin: Negative for injury, rash, and discoloration, Neuro: Negative for headache,           

      weakness, numbness, tingling, and seizure.                                                  

21:47 Abdomen/GI: Positive for vomiting, hematemesis, Negative for abdominal pain, diarrhea,      

      constipation.                                                                               

21:47 All other systems are negative.                                                             

                                                                                                  

Exam:                                                                                             

21:47 Constitutional:  This is a well developed, well nourished patient who is awake, alert,  pm1 

      and in no acute distress. Head/Face:  Normocephalic, atraumatic. Neck:  Trachea             

      midline, no thyromegaly or masses palpated, and no cervical lymphadenopathy.  Supple,       

      full range of motion without nuchal rigidity, or vertebral point tenderness.  No            

      Meningismus. Chest/axilla:  Normal chest wall appearance and motion.  Nontender with no     

      deformity.  No lesions are appreciated. Cardiovascular:  Regular rate and rhythm with a     

      normal S1 and S2.  No gallops, murmurs, or rubs.  No pulse deficits. Respiratory:           

      Lungs have equal breath sounds bilaterally, clear to auscultation and percussion.  No       

      rales, rhonchi or wheezes noted.  No increased work of breathing, no retractions or         

      nasal flaring. Abdomen/GI:  Soft, non-tender, with normal bowel sounds.  No distension      

      or tympany.  No guarding or rebound.  No evidence of tenderness throughout. Back:  No       

      spinal tenderness.  No costovertebral tenderness.  Full range of motion. Skin:  Warm,       

      dry with normal turgor.  Normal color with no rashes, no lesions, and no evidence of        

      cellulitis. MS/ Extremity:  Pulses equal, no cyanosis.  Neurovascular intact.  Full,        

      normal range of motion.                                                                     

21:47 Neuro: Orientation: is normal, Motor: is normal, moves all fours, Sensation: is normal,     

      no obvious gross deficits.                                                                  

                                                                                                  

Vital Signs:                                                                                      

21:23  / 76; Pulse 82; Resp 18; Temp 98.3; Pulse Ox 97% on R/A; Weight 79.38 kg; Height ea  

      6 ft. 0 in. (182.88 cm);                                                                    

22:44  / 82; Pulse 82; Resp 18; Pulse Ox 99% on R/A;                                    ao  

                                                                                             

00:34  / 74; Pulse 68; Resp 18; Pulse Ox 98% on R/A;                                    ao  

01:30  / 76; Pulse 78; Resp 16; Pulse Ox 97% on R/A;                                    ao  

02:20  / 62; Pulse 62; Resp 18; Pulse Ox 100% on R/A;                                   ao  

                                                                                             

21:23 Body Mass Index 23.73 (79.38 kg, 182.88 cm)                                             ea  

                                                                                                  

MDM:                                                                                              

                                                                                             

21:41 Patient medically screened.                                                             pm1 

                                                                                             

00:04 Data reviewed: vital signs. Data interpreted: Pulse oximetry: on room air is 99 %.      pm1 

      Interpretation: normal.                                                                     

00:14 Physician consultation: Roberto Springer MD was called at 00:14, was contacted at 00:23,  pm1 

      regarding consult, patient's condition, and will see patient tomorrow, NPO, continue        

      octreotide and PPI drips.                                                                   

00:25 Physician consultation: Eben Carias was called at 00:26, was contacted at 00:26,      pm1 

      regarding admission, patient's condition, and will see patient.                             

01:05 ED course: Patient placed in ICU for upper GI bleed and alcoholism. Patient drinks 6-8  pm1 

      beers daily for multiple years.                                                             

01:05 ED course: Total amount of vomit observed by me was 100 mL. His RN reports that there   pm1 

      was another 100 mL total that I did not see. 200 mL vomited by patient throughout ER        

      stay.                                                                                       

                                                                                                  

                                                                                             

21:45 Order name: Basic Metabolic Panel; Complete Time: 22:19                                 pm                                                                                             

21:45 Order name: CBC with Diff; Complete Time: 22:19                                         pm                                                                                             

21:45 Order name: Creatinine for Radiology; Complete Time: 22:19                              pm                                                                                             

21:45 Order name: Hepatic Function; Complete Time: 22:19                                      pm                                                                                             

21:45 Order name: Lipase; Complete Time: 22:19                                                pm                                                                                             

21:45 Order name: ETOH Level; Complete Time: 22:19                                            pm1 

                                                                                             

21:45 Order name: PT-INR; Complete Time: 22:19                                                pm                                                                                             

22:04 Order name: Type And Screen; Complete Time: 00:06                                       pm1 

                                                                                             

22:04 Order name: CT Abd/Pelvis - IV Contrast Only                                            pm1 

                                                                                             

22:52 Order name: Gastric Occult Blood; Complete Time: 23:28                                  ao  

                                                                                             

21:45 Order name: IV Saline Lock; Complete Time: 21:47                                        pm1 

                                                                                             

21:45 Order name: Labs collected and sent; Complete Time: 21:47                               pm1 

                                                                                             

22:04 Order name: Gastrocult; Complete Time: 22:44                                            pm1 

                                                                                             

23:42 Order name: NPO; Complete Time: 23:46                                                   pm1 

                                                                                                  

Administered Medications:                                                                         

                                                                                             

22:31 Drug: Zofran 4 mg Route: IVP; Site: right antecubital;                                  ao  

                                                                                             

00:25 Follow up: Response: No adverse reaction                                                ao  

                                                                                             

22:31 Drug: ProTONIX 40 mg Route: IVP; Site: left antecubital;                                ao  

                                                                                             

00:25 Follow up: Response: No adverse reaction                                                ao  

                                                                                             

22:32 Drug: NS 0.9% 1000 ml Route: IV; Rate: 1000 ml; Site: right antecubital;                ao  

                                                                                             

00:25 Follow up: IV Status: Completed infusion; IV Intake: 1000ml                             ao  

                                                                                             

23:51 Drug: ProTONIX 8 mg/hr Route: IV; Rate: 25 ml/hr; Site: left hand;                      ao  

                                                                                             

01:58 Follow up: IV Status: Infusion continued upon admission                                 ao  

                                                                                             

23:51 Drug: Octreotide 50 mcg Route: IV; Rate: calculated rate; Site: right antecubital;      ao  

                                                                                             

00:25 Follow up: Response: No adverse reaction; IV Status: Completed infusion                 ao  

00:04 Drug: Octreotide Infusion (50 mcg/hr) - (Octreotide 500 mcg, NS 0.9% 500 ml) Route: IV; ao  

      Rate: 50 ml/hr; Site: left hand;                                                            

01:58 Follow up: IV Status: Infusion continued upon admission                                 ao  

00:12 Drug: Zofran 4 mg Route: IVP; Site: right antecubital;                                  ao  

00:26 Follow up: Response: No adverse reaction                                                ao  

                                                                                                  

                                                                                                  

Point of Care Testing:                                                                            

      Guaiac:                                                                                     

                                                                                             

22:45 Emesis Guaiac: Positive; Emesis Hemoccult Control: Pass                                 ao  

Disposition:                                                                                      

20 00:25 Hospitalization ordered by Eben Carias for Inpatient Admission. Preliminary     

  diagnosis is Gastrointestinal hemorrhage, unspecified.                                          

- Bed requested for Intensive Care Unit.                                                          

- Status is Inpatient Admission.                                                              ao  

- Condition is Fair.                                                                              

- Problem is new.                                                                                 

- Symptoms have improved.                                                                         

                                                                                                  

                                                                                                  

                                                                                                  

Addendum:                                                                                         

2020                                                                                        

     16:31 Co-signature as Attending Physician, Fred Willard MD.                               m
a2

                                                                                                  

Signatures:                                                                                       

Dispatcher [employer]                           EDMS                                                 

Evelyn Preciado RN                        Bernard Antonio RN                         RN   Omer Wilkinson, NP                    NP   pm1                                                  

Chantal Bowens RN RN ea Alzahri, Mohammad, MD MD   ma2                                                  

                                                                                                  

Corrections: (The following items were deleted from the chart)                                    

                                                                                             

00:25 00:14 Physician consultation: Roberto Springer MD was called at 00:14, pm1                pm1 

01:01 00:25 Hospitalization Ordered by Eben Carias for Inpatient Admission. Preliminary     mw  

      diagnosis is Gastrointestinal hemorrhage, unspecified. Bed requested for Intensive Care     

      Unit. Status is Inpatient Admission. Condition is Fair. Problem is new. Symptoms have       

      improved. pm1                                                                               

02:24 01:01 2020 00:25 Hospitalization Ordered by Eben Carias for Inpatient           ao  

      Admission. Preliminary diagnosis is Gastrointestinal hemorrhage, unspecified. Bed           

      requested for Intensive Care Unit. Status is Inpatient Admission. Condition is Fair.        

      Problem is new. Symptoms have improved. mw                                                  

                                                                                                  

**************************************************************************************************

## 2020-02-18 NOTE — RAD REPORT
EXAM DESCRIPTION:  CT abdomen and pelvis with IV contrast

 

CLINICAL HISTORY:  40-year-old male with GI bleed, history of esophagitis, diabetes and daily ethanol
 use

 

TECHNIQUE:  Axial CT imaging of the abdomen and pelvis was performed following the administration of 
intravenous contrast..   Sagittal and coronal reconstructed images were then performed.   The CT stud
y is performed according to ALARA (as low as reasonably achievable) or ALARA/IMAGE GENTLY, with autom
atic adjustment of mA and/or kV according to patient size.

Performed on: 2/17/2020 at 10:51 PM.

 

COMPARISON:  None

 

FINDINGS:  Lung bases: The lung bases are clear.

Liver: The liver is normal in size and configuration. No focal hepatic abnormalities are identified. 
Liver attenuation is within normal limits.

Spleen: The spleen is normal is size, configuration and attenuation.

Gallbladder and bile duct:   The gallbladder is well distended and unremarkable. There is no biliary 
ductal dilatation.

Pancreas: The pancreas is grossly normal in size and configuration.

Adrenal Glands: The adrenal glands are normal in size and configuration.

Kidneys: The kidneys are normal in size and configuration. There is no evidence of hydronephrosis. Th
ere is no evidence of nephrolithiasis. There is a 1.4 cm left renal cortical cyst.

Stomach: The stomach is grossly normal. There is no definite hiatal hernia.

Bowel: The bowel gas pattern is non specific and non obstructive.

Appendix: The appendix is normal.

Free air: There is no evidence of free air.

Free fluid: There is no evidence of free fluid.

Vasculature: The aorta is normal in caliber and contour. The inferior vena cava is grossly unremarkab
le.

Lymphadenopathy: No pathologic lymphadenopathy is identified.

Bladder: The bladder is well distended and smooth in contour.

Reproductive: The prostate gland is grossly within normal limits.

Bones: No acute osseous abnormalities are identified. There are prominent Schmorl's nodes present feli
ng the endplates of multiple thoracolumbar vertebrae.

Soft tissues: No focal soft tissue abnormalities are identified.

 

IMPRESSION:  No evidence of acute intra-abdominal or intrapelvic pathology.

 

Electronically signed by:   Abbi Moreno DO   2/17/2020 11:18 PM CST Workstation: [id number]

 

 

Due to temporary technical issues with the PACS/Fluency reporting system, reports are being signed by
 the in house radiologist as a courtesy to ensure prompt reporting. The interpreting radiologist is f
wilmerly responsible for the content of the report.

## 2020-02-19 VITALS — DIASTOLIC BLOOD PRESSURE: 67 MMHG | SYSTOLIC BLOOD PRESSURE: 113 MMHG | TEMPERATURE: 98 F

## 2020-02-19 LAB
BUN BLD-MCNC: 13 MG/DL (ref 7–18)
GLUCOSE SERPLBLD-MCNC: 154 MG/DL (ref 74–106)
HCT VFR BLD CALC: 42 % (ref 39.6–49)
LYMPHOCYTES # SPEC AUTO: 1.1 K/UL (ref 0.7–4.9)
PMV BLD: 9.8 FL (ref 7.6–11.3)
POTASSIUM SERPL-SCNC: 4 MMOL/L (ref 3.5–5.1)
RBC # BLD: 4.18 M/UL (ref 4.33–5.43)

## 2020-02-19 RX ADMIN — PANTOPRAZOLE SODIUM SCH MG: 40 TABLET, DELAYED RELEASE ORAL at 08:18

## 2020-02-19 RX ADMIN — HUMAN INSULIN SCH UNIT: 100 INJECTION, SOLUTION SUBCUTANEOUS at 12:37

## 2020-02-19 RX ADMIN — Medication SCH ML: at 08:20

## 2020-02-19 RX ADMIN — SODIUM CHLORIDE SCH MLS: 0.9 INJECTION, SOLUTION INTRAVENOUS at 03:31

## 2020-02-19 RX ADMIN — HUMAN INSULIN SCH UNIT: 100 INJECTION, SOLUTION SUBCUTANEOUS at 08:19

## 2024-09-03 LAB
ANION GAP SERPL CALC-SCNC: 7.2 MEQ/L (ref 5–15)
APTT BLD: 26.6 SECONDS (ref 24.3–36.9)
BUN BLD-MCNC: 19 MG/DL (ref 7–18)
GLUCOSE SERPLBLD-MCNC: 212 MG/DL (ref 74–106)
HCT VFR BLD CALC: 42.5 % (ref 39.6–49)
HGB BLD-MCNC: 14.3 G/DL (ref 13.6–17.9)
INR BLD: 0.93
LYMPHOCYTES # SPEC AUTO: 1.2 K/UL (ref 0.7–4.9)
MCH RBC QN AUTO: 34.3 PG (ref 27–35)
MCHC RBC AUTO-ENTMCNC: 33.8 G/DL (ref 32–36)
MCV RBC: 101.7 FL (ref 80–100)
NRBC # BLD: 0 10*3/UL (ref 0–0)
NRBC BLD AUTO-RTO: 0 % (ref 0–0)
PMV BLD: 8.7 FL (ref 7.6–11.3)
POTASSIUM SERPL-SCNC: 4.2 MEQ/L (ref 3.5–5.1)
PROTHROMBIN TIME: 10.4 SECONDS (ref 9.4–12.5)
RBC # BLD: 4.18 M/UL (ref 4.33–5.43)
WBC # BLD AUTO: 4.9 THOU/UL (ref 4.3–10.9)

## 2024-09-03 NOTE — RAD REPORT
EXAM DESCRIPTION:  RAD - Chest Pa And Lat (2 Views) - 9/3/2024 4:07 pm

 

CLINICAL HISTORY:  pre op for cath lab

Chest pain.

 

COMPARISON:  <Comparisons>

 

FINDINGS:  The lungs are clear. The heart is normal in size. No displaced fractures.

 

IMPRESSION:  No acute or concerning finding suspected.

## 2024-09-04 NOTE — EKG
Test Date:    2024-09-03               Test Time:    15:33:37

Technician:   NISSA                                     

                                                     

MEASUREMENT RESULTS:                                       

Intervals:                                           

Rate:         55                                     

GA:           144                                    

QRSD:         88                                     

QT:           422                                    

QTc:          403                                    

Axis:                                                

P:            60                                     

GA:           144                                    

QRS:          65                                     

T:            63                                     

                                                     

INTERPRETIVE STATEMENTS:                                       

                                                     

Sinus bradycardia

Otherwise normal ECG

Compared to ECG 06/10/2019 23:25:12

Sinus rhythm no longer present



Electronically Signed On 09-04-24 12:06:36 CDT by Luisito Jolly

## 2024-09-06 ENCOUNTER — HOSPITAL ENCOUNTER (OUTPATIENT)
Dept: HOSPITAL 97 - CCL | Age: 44
Discharge: HOME | End: 2024-09-06
Attending: INTERNAL MEDICINE
Payer: COMMERCIAL

## 2024-09-06 VITALS — DIASTOLIC BLOOD PRESSURE: 57 MMHG | SYSTOLIC BLOOD PRESSURE: 123 MMHG | OXYGEN SATURATION: 99 %

## 2024-09-06 DIAGNOSIS — I65.29: ICD-10-CM

## 2024-09-06 DIAGNOSIS — I73.9: ICD-10-CM

## 2024-09-06 DIAGNOSIS — Z79.899: ICD-10-CM

## 2024-09-06 DIAGNOSIS — Z79.84: ICD-10-CM

## 2024-09-06 DIAGNOSIS — E11.9: ICD-10-CM

## 2024-09-06 DIAGNOSIS — I25.10: Primary | ICD-10-CM

## 2024-09-06 DIAGNOSIS — Z79.82: ICD-10-CM

## 2024-09-06 DIAGNOSIS — Z79.85: ICD-10-CM

## 2024-09-06 PROCEDURE — 85025 COMPLETE CBC W/AUTO DIFF WBC: CPT

## 2024-09-06 PROCEDURE — 82947 ASSAY GLUCOSE BLOOD QUANT: CPT

## 2024-09-06 PROCEDURE — 93458 L HRT ARTERY/VENTRICLE ANGIO: CPT

## 2024-09-06 PROCEDURE — 80048 BASIC METABOLIC PNL TOTAL CA: CPT

## 2024-09-06 PROCEDURE — 71046 X-RAY EXAM CHEST 2 VIEWS: CPT

## 2024-09-06 PROCEDURE — 93005 ELECTROCARDIOGRAM TRACING: CPT

## 2024-09-06 PROCEDURE — 85610 PROTHROMBIN TIME: CPT

## 2024-09-06 PROCEDURE — 85730 THROMBOPLASTIN TIME PARTIAL: CPT

## 2024-09-06 PROCEDURE — 36415 COLL VENOUS BLD VENIPUNCTURE: CPT

## 2024-09-06 PROCEDURE — 76937 US GUIDE VASCULAR ACCESS: CPT

## 2024-09-06 NOTE — OP
Date of Procedure:  09/06/2024



Surgeon:  SOTO BANDA



Procedures Performed:  

1.Selective coronary angiogram.

2.Left heart catheterization.



Indication:  Chest pain with a grossly abnormal stress test.



Access:  Right radial artery 6-Tajik closed with TR band.



Anesthesia:  Total sedation time is 45 minutes.



Complications:  None.



Bleeding:  Less than 20 mL.



Description Of Procedure:  After risks, benefits, and alternatives were explained, patient agreed to 
the procedure and signed informed consent.  The patient was brought into cardiac catheterization labo
[facility], prepped and draped in sterile fashion.  Then, I accessed right radial artery using pediatric 
micropuncture kit, placed a 6-Tajik Slender sheath, took 5-Tajik [place] 4 catheter into the aortic r
oot, crossed the aortic valve, measured the LVEDP.  Pullback did not record any gradient.  Then, enga
ged the left main and took standard views and then the RCA and took standard views and then removed t
he catheter and the sheath, placed TR band with good hemostasis.



Findings:  

1.Left main is large and normal.

2.LAD:  A large vessel.  Proximal segment is normal.  Mid segment, there is focal 30% to 40% stenosi
s after diagonal takeoff, and diagonal branch has proximal 30% stenosis.  Rest of LAD appears to be n
ormal.

3.Left circumflex is moderate-sized vessel, normal.

4.RCA:  Large size vessel and dominant and normal.  Normal LPDA and PLB.  PLB is small.

5.LVEDP slightly elevated at 50 mmHg.



Conclusions:  

1.Mild nonobstructive coronary artery disease.

2.Mildly elevated LVEDP.



Recommendation:  Medical management.





/MODL

DD:  09/06/2024 13:21:47Voice ID:  [id number]

DT:  09/06/2024 15:10:36Report ID:  [id number]